# Patient Record
Sex: FEMALE | Race: WHITE | NOT HISPANIC OR LATINO | Employment: OTHER
[De-identification: names, ages, dates, MRNs, and addresses within clinical notes are randomized per-mention and may not be internally consistent; named-entity substitution may affect disease eponyms.]

---

## 2017-06-05 ENCOUNTER — RECORDS - HEALTHEAST (OUTPATIENT)
Dept: ADMINISTRATIVE | Facility: OTHER | Age: 82
End: 2017-06-05

## 2017-08-07 ENCOUNTER — COMMUNICATION - HEALTHEAST (OUTPATIENT)
Dept: FAMILY MEDICINE | Facility: CLINIC | Age: 82
End: 2017-08-07

## 2017-08-07 DIAGNOSIS — M85.80 LOW BONE MASS: ICD-10-CM

## 2017-08-23 ENCOUNTER — COMMUNICATION - HEALTHEAST (OUTPATIENT)
Dept: FAMILY MEDICINE | Facility: CLINIC | Age: 82
End: 2017-08-23

## 2017-08-23 DIAGNOSIS — I10 HTN (HYPERTENSION): ICD-10-CM

## 2017-08-23 DIAGNOSIS — I10 ESSENTIAL HYPERTENSION WITH GOAL BLOOD PRESSURE LESS THAN 140/90: ICD-10-CM

## 2017-09-12 ENCOUNTER — OFFICE VISIT - HEALTHEAST (OUTPATIENT)
Dept: FAMILY MEDICINE | Facility: CLINIC | Age: 82
End: 2017-09-12

## 2017-09-12 DIAGNOSIS — E78.5 HYPERLIPIDEMIA: ICD-10-CM

## 2017-09-12 DIAGNOSIS — R53.83 FATIGUE: ICD-10-CM

## 2017-09-12 DIAGNOSIS — M85.80 LOW BONE MASS: ICD-10-CM

## 2017-09-12 DIAGNOSIS — Z00.00 ROUTINE GENERAL MEDICAL EXAMINATION AT A HEALTH CARE FACILITY: ICD-10-CM

## 2017-09-12 DIAGNOSIS — I10 UNSPECIFIED ESSENTIAL HYPERTENSION: ICD-10-CM

## 2017-09-12 LAB
CHOLEST SERPL-MCNC: 193 MG/DL
FASTING STATUS PATIENT QL REPORTED: YES
HDLC SERPL-MCNC: 69 MG/DL
LDLC SERPL CALC-MCNC: 102 MG/DL
TRIGL SERPL-MCNC: 109 MG/DL

## 2017-09-12 ASSESSMENT — MIFFLIN-ST. JEOR: SCORE: 818.45

## 2017-12-15 ENCOUNTER — COMMUNICATION - HEALTHEAST (OUTPATIENT)
Dept: FAMILY MEDICINE | Facility: CLINIC | Age: 82
End: 2017-12-15

## 2017-12-15 DIAGNOSIS — I10 HTN (HYPERTENSION): ICD-10-CM

## 2018-04-16 ENCOUNTER — COMMUNICATION - HEALTHEAST (OUTPATIENT)
Dept: FAMILY MEDICINE | Facility: CLINIC | Age: 83
End: 2018-04-16

## 2018-04-16 DIAGNOSIS — M85.80 LOW BONE MASS: ICD-10-CM

## 2018-04-20 ENCOUNTER — COMMUNICATION - HEALTHEAST (OUTPATIENT)
Dept: FAMILY MEDICINE | Facility: CLINIC | Age: 83
End: 2018-04-20

## 2018-04-20 DIAGNOSIS — E78.5 HYPERLIPIDEMIA: ICD-10-CM

## 2018-08-19 ENCOUNTER — COMMUNICATION - HEALTHEAST (OUTPATIENT)
Dept: FAMILY MEDICINE | Facility: CLINIC | Age: 83
End: 2018-08-19

## 2018-08-19 DIAGNOSIS — M85.80 LOW BONE MASS: ICD-10-CM

## 2018-09-17 ENCOUNTER — OFFICE VISIT - HEALTHEAST (OUTPATIENT)
Dept: FAMILY MEDICINE | Facility: CLINIC | Age: 83
End: 2018-09-17

## 2018-09-17 DIAGNOSIS — E78.5 HYPERLIPIDEMIA: ICD-10-CM

## 2018-09-17 DIAGNOSIS — M85.80 LOW BONE MASS: ICD-10-CM

## 2018-09-17 DIAGNOSIS — Z00.00 MEDICARE ANNUAL WELLNESS VISIT, SUBSEQUENT: ICD-10-CM

## 2018-09-17 DIAGNOSIS — I10 ESSENTIAL HYPERTENSION: ICD-10-CM

## 2018-09-17 DIAGNOSIS — R41.3 MEMORY LOSS: ICD-10-CM

## 2018-09-17 DIAGNOSIS — R53.83 FATIGUE: ICD-10-CM

## 2018-09-17 LAB
ALBUMIN SERPL-MCNC: 4.2 G/DL (ref 3.5–5)
ALP SERPL-CCNC: 61 U/L (ref 45–120)
ALT SERPL W P-5'-P-CCNC: 13 U/L (ref 0–45)
ANION GAP SERPL CALCULATED.3IONS-SCNC: 11 MMOL/L (ref 5–18)
AST SERPL W P-5'-P-CCNC: 17 U/L (ref 0–40)
BILIRUB SERPL-MCNC: 0.6 MG/DL (ref 0–1)
BUN SERPL-MCNC: 10 MG/DL (ref 8–28)
CALCIUM SERPL-MCNC: 10.2 MG/DL (ref 8.5–10.5)
CHLORIDE BLD-SCNC: 100 MMOL/L (ref 98–107)
CHOLEST SERPL-MCNC: 186 MG/DL
CO2 SERPL-SCNC: 27 MMOL/L (ref 22–31)
CREAT SERPL-MCNC: 0.8 MG/DL (ref 0.6–1.1)
ERYTHROCYTE [DISTWIDTH] IN BLOOD BY AUTOMATED COUNT: 11 % (ref 11–14.5)
FASTING STATUS PATIENT QL REPORTED: YES
GFR SERPL CREATININE-BSD FRML MDRD: >60 ML/MIN/1.73M2
GLUCOSE BLD-MCNC: 92 MG/DL (ref 70–125)
HCT VFR BLD AUTO: 37.1 % (ref 35–47)
HDLC SERPL-MCNC: 58 MG/DL
HGB BLD-MCNC: 12.6 G/DL (ref 12–16)
LDLC SERPL CALC-MCNC: 102 MG/DL
MCH RBC QN AUTO: 31.7 PG (ref 27–34)
MCHC RBC AUTO-ENTMCNC: 33.9 G/DL (ref 32–36)
MCV RBC AUTO: 94 FL (ref 80–100)
PLATELET # BLD AUTO: 227 THOU/UL (ref 140–440)
PMV BLD AUTO: 8.2 FL (ref 7–10)
POTASSIUM BLD-SCNC: 4.5 MMOL/L (ref 3.5–5)
PROT SERPL-MCNC: 7.8 G/DL (ref 6–8)
RBC # BLD AUTO: 3.96 MILL/UL (ref 3.8–5.4)
SODIUM SERPL-SCNC: 138 MMOL/L (ref 136–145)
TRIGL SERPL-MCNC: 132 MG/DL
TSH SERPL DL<=0.005 MIU/L-ACNC: 2.07 UIU/ML (ref 0.3–5)
WBC: 6.9 THOU/UL (ref 4–11)

## 2018-09-17 ASSESSMENT — MIFFLIN-ST. JEOR: SCORE: 805.98

## 2018-09-18 LAB — 25(OH)D3 SERPL-MCNC: 31.4 NG/ML (ref 30–80)

## 2018-10-16 ENCOUNTER — RECORDS - HEALTHEAST (OUTPATIENT)
Dept: ADMINISTRATIVE | Facility: OTHER | Age: 83
End: 2018-10-16

## 2018-12-13 ENCOUNTER — COMMUNICATION - HEALTHEAST (OUTPATIENT)
Dept: FAMILY MEDICINE | Facility: CLINIC | Age: 83
End: 2018-12-13

## 2018-12-13 DIAGNOSIS — I10 HTN (HYPERTENSION): ICD-10-CM

## 2018-12-13 DIAGNOSIS — E78.5 HYPERLIPIDEMIA: ICD-10-CM

## 2019-04-13 ENCOUNTER — COMMUNICATION - HEALTHEAST (OUTPATIENT)
Dept: FAMILY MEDICINE | Facility: CLINIC | Age: 84
End: 2019-04-13

## 2019-04-13 DIAGNOSIS — M85.80 LOW BONE MASS: ICD-10-CM

## 2019-04-16 ENCOUNTER — COMMUNICATION - HEALTHEAST (OUTPATIENT)
Dept: FAMILY MEDICINE | Facility: CLINIC | Age: 84
End: 2019-04-16

## 2019-04-16 DIAGNOSIS — M85.80 LOW BONE MASS: ICD-10-CM

## 2019-07-08 ENCOUNTER — COMMUNICATION - HEALTHEAST (OUTPATIENT)
Dept: FAMILY MEDICINE | Facility: CLINIC | Age: 84
End: 2019-07-08

## 2019-07-08 DIAGNOSIS — M85.80 LOW BONE MASS: ICD-10-CM

## 2019-10-03 ENCOUNTER — OFFICE VISIT - HEALTHEAST (OUTPATIENT)
Dept: FAMILY MEDICINE | Facility: CLINIC | Age: 84
End: 2019-10-03

## 2019-10-03 DIAGNOSIS — M85.80 LOW BONE MASS: ICD-10-CM

## 2019-10-03 DIAGNOSIS — R41.3 MEMORY LOSS: ICD-10-CM

## 2019-10-03 DIAGNOSIS — Z00.00 MEDICARE ANNUAL WELLNESS VISIT, SUBSEQUENT: ICD-10-CM

## 2019-10-03 DIAGNOSIS — E56.9 VITAMIN DEFICIENCY: ICD-10-CM

## 2019-10-03 DIAGNOSIS — I10 ESSENTIAL HYPERTENSION: ICD-10-CM

## 2019-10-03 DIAGNOSIS — E78.5 HYPERLIPIDEMIA, UNSPECIFIED HYPERLIPIDEMIA TYPE: ICD-10-CM

## 2019-10-03 LAB
ALBUMIN SERPL-MCNC: 4.1 G/DL (ref 3.5–5)
ALP SERPL-CCNC: 66 U/L (ref 45–120)
ALT SERPL W P-5'-P-CCNC: 14 U/L (ref 0–45)
ANION GAP SERPL CALCULATED.3IONS-SCNC: 12 MMOL/L (ref 5–18)
AST SERPL W P-5'-P-CCNC: 19 U/L (ref 0–40)
BILIRUB SERPL-MCNC: 0.7 MG/DL (ref 0–1)
BUN SERPL-MCNC: 10 MG/DL (ref 8–28)
CALCIUM SERPL-MCNC: 10.3 MG/DL (ref 8.5–10.5)
CHLORIDE BLD-SCNC: 96 MMOL/L (ref 98–107)
CHOLEST SERPL-MCNC: 173 MG/DL
CO2 SERPL-SCNC: 28 MMOL/L (ref 22–31)
CREAT SERPL-MCNC: 0.8 MG/DL (ref 0.6–1.1)
ERYTHROCYTE [DISTWIDTH] IN BLOOD BY AUTOMATED COUNT: 11.2 % (ref 11–14.5)
FASTING STATUS PATIENT QL REPORTED: YES
GFR SERPL CREATININE-BSD FRML MDRD: >60 ML/MIN/1.73M2
GLUCOSE BLD-MCNC: 96 MG/DL (ref 70–125)
HCT VFR BLD AUTO: 35.9 % (ref 35–47)
HDLC SERPL-MCNC: 66 MG/DL
HGB BLD-MCNC: 12.3 G/DL (ref 12–16)
LDLC SERPL CALC-MCNC: 76 MG/DL
MCH RBC QN AUTO: 32.5 PG (ref 27–34)
MCHC RBC AUTO-ENTMCNC: 34.2 G/DL (ref 32–36)
MCV RBC AUTO: 95 FL (ref 80–100)
PLATELET # BLD AUTO: 230 THOU/UL (ref 140–440)
PMV BLD AUTO: 7.6 FL (ref 7–10)
POTASSIUM BLD-SCNC: 4.4 MMOL/L (ref 3.5–5)
PROT SERPL-MCNC: 7.4 G/DL (ref 6–8)
RBC # BLD AUTO: 3.77 MILL/UL (ref 3.8–5.4)
SODIUM SERPL-SCNC: 136 MMOL/L (ref 136–145)
TRIGL SERPL-MCNC: 155 MG/DL
TSH SERPL DL<=0.005 MIU/L-ACNC: 1.88 UIU/ML (ref 0.3–5)
WBC: 6.8 THOU/UL (ref 4–11)

## 2019-10-03 ASSESSMENT — MIFFLIN-ST. JEOR: SCORE: 828.82

## 2019-10-04 LAB — 25(OH)D3 SERPL-MCNC: 30.1 NG/ML (ref 30–80)

## 2019-10-07 ENCOUNTER — COMMUNICATION - HEALTHEAST (OUTPATIENT)
Dept: FAMILY MEDICINE | Facility: CLINIC | Age: 84
End: 2019-10-07

## 2019-10-22 ENCOUNTER — COMMUNICATION - HEALTHEAST (OUTPATIENT)
Dept: FAMILY MEDICINE | Facility: CLINIC | Age: 84
End: 2019-10-22

## 2019-10-22 DIAGNOSIS — M85.80 LOW BONE MASS: ICD-10-CM

## 2019-10-23 ENCOUNTER — RECORDS - HEALTHEAST (OUTPATIENT)
Dept: ADMINISTRATIVE | Facility: OTHER | Age: 84
End: 2019-10-23

## 2019-11-01 ENCOUNTER — COMMUNICATION - HEALTHEAST (OUTPATIENT)
Dept: FAMILY MEDICINE | Facility: CLINIC | Age: 84
End: 2019-11-01

## 2019-11-01 DIAGNOSIS — M85.80 LOW BONE MASS: ICD-10-CM

## 2019-11-06 ENCOUNTER — COMMUNICATION - HEALTHEAST (OUTPATIENT)
Dept: FAMILY MEDICINE | Facility: CLINIC | Age: 84
End: 2019-11-06

## 2019-11-06 DIAGNOSIS — I10 HTN (HYPERTENSION): ICD-10-CM

## 2019-11-06 DIAGNOSIS — M85.80 LOW BONE MASS: ICD-10-CM

## 2019-11-06 DIAGNOSIS — E78.5 HYPERLIPIDEMIA: ICD-10-CM

## 2020-05-05 ENCOUNTER — COMMUNICATION - HEALTHEAST (OUTPATIENT)
Dept: FAMILY MEDICINE | Facility: CLINIC | Age: 85
End: 2020-05-05

## 2020-05-05 DIAGNOSIS — R41.3 MEMORY LOSS: ICD-10-CM

## 2020-08-04 ENCOUNTER — COMMUNICATION - HEALTHEAST (OUTPATIENT)
Dept: FAMILY MEDICINE | Facility: CLINIC | Age: 85
End: 2020-08-04

## 2020-08-04 DIAGNOSIS — R41.3 MEMORY LOSS: ICD-10-CM

## 2020-10-20 ENCOUNTER — COMMUNICATION - HEALTHEAST (OUTPATIENT)
Dept: FAMILY MEDICINE | Facility: CLINIC | Age: 85
End: 2020-10-20

## 2020-10-20 DIAGNOSIS — R41.3 MEMORY LOSS: ICD-10-CM

## 2020-10-21 ENCOUNTER — COMMUNICATION - HEALTHEAST (OUTPATIENT)
Dept: FAMILY MEDICINE | Facility: CLINIC | Age: 85
End: 2020-10-21

## 2020-10-21 DIAGNOSIS — M85.80 LOW BONE MASS: ICD-10-CM

## 2020-10-21 DIAGNOSIS — I10 HTN (HYPERTENSION): ICD-10-CM

## 2020-10-21 DIAGNOSIS — M25.559 HIP PAIN: ICD-10-CM

## 2020-10-21 DIAGNOSIS — E78.5 HYPERLIPIDEMIA: ICD-10-CM

## 2020-10-26 ENCOUNTER — COMMUNICATION - HEALTHEAST (OUTPATIENT)
Dept: TELEHEALTH | Facility: CLINIC | Age: 85
End: 2020-10-26

## 2020-10-26 ENCOUNTER — OFFICE VISIT - HEALTHEAST (OUTPATIENT)
Dept: FAMILY MEDICINE | Facility: CLINIC | Age: 85
End: 2020-10-26

## 2020-10-26 ENCOUNTER — COMMUNICATION - HEALTHEAST (OUTPATIENT)
Dept: FAMILY MEDICINE | Facility: CLINIC | Age: 85
End: 2020-10-26

## 2020-10-26 DIAGNOSIS — E78.5 HYPERLIPIDEMIA, UNSPECIFIED HYPERLIPIDEMIA TYPE: ICD-10-CM

## 2020-10-26 DIAGNOSIS — I10 ESSENTIAL HYPERTENSION: ICD-10-CM

## 2020-10-26 DIAGNOSIS — K13.0 LIP ULCER: ICD-10-CM

## 2020-10-26 DIAGNOSIS — R41.3 MEMORY LOSS: ICD-10-CM

## 2020-10-26 DIAGNOSIS — M85.80 LOW BONE MASS: ICD-10-CM

## 2020-10-26 DIAGNOSIS — E55.9 VITAMIN D DEFICIENCY: ICD-10-CM

## 2020-10-26 RX ORDER — ALENDRONATE SODIUM 70 MG/1
TABLET ORAL
Qty: 12 TABLET | Refills: 3 | Status: SHIPPED | OUTPATIENT
Start: 2020-10-26 | End: 2021-11-08 | Stop reason: ALTCHOICE

## 2020-10-28 ENCOUNTER — COMMUNICATION - HEALTHEAST (OUTPATIENT)
Dept: FAMILY MEDICINE | Facility: CLINIC | Age: 85
End: 2020-10-28

## 2020-10-28 DIAGNOSIS — R41.3 MEMORY LOSS: ICD-10-CM

## 2020-11-11 ENCOUNTER — COMMUNICATION - HEALTHEAST (OUTPATIENT)
Dept: FAMILY MEDICINE | Facility: CLINIC | Age: 85
End: 2020-11-11

## 2020-11-11 DIAGNOSIS — R41.3 MEMORY LOSS: ICD-10-CM

## 2020-11-15 RX ORDER — MEMANTINE HYDROCHLORIDE 5 MG/1
5 TABLET ORAL DAILY
Qty: 90 TABLET | Refills: 3 | Status: SHIPPED | OUTPATIENT
Start: 2020-11-15 | End: 2022-08-04

## 2021-01-13 ENCOUNTER — AMBULATORY - HEALTHEAST (OUTPATIENT)
Dept: FAMILY MEDICINE | Facility: CLINIC | Age: 86
End: 2021-01-13

## 2021-01-13 DIAGNOSIS — E55.9 VITAMIN D DEFICIENCY: ICD-10-CM

## 2021-02-02 ENCOUNTER — COMMUNICATION - HEALTHEAST (OUTPATIENT)
Dept: FAMILY MEDICINE | Facility: CLINIC | Age: 86
End: 2021-02-02

## 2021-02-02 DIAGNOSIS — I10 HTN (HYPERTENSION): ICD-10-CM

## 2021-02-02 DIAGNOSIS — E78.5 HYPERLIPIDEMIA: ICD-10-CM

## 2021-02-02 DIAGNOSIS — K13.0 LIP ULCER: ICD-10-CM

## 2021-02-02 DIAGNOSIS — M25.559 HIP PAIN: ICD-10-CM

## 2021-02-02 RX ORDER — MUPIROCIN 20 MG/G
OINTMENT TOPICAL
Qty: 22 G | Refills: 0 | Status: SHIPPED | OUTPATIENT
Start: 2021-02-02 | End: 2021-07-15

## 2021-02-10 ENCOUNTER — RECORDS - HEALTHEAST (OUTPATIENT)
Dept: ADMINISTRATIVE | Facility: OTHER | Age: 86
End: 2021-02-10

## 2021-02-12 ENCOUNTER — RECORDS - HEALTHEAST (OUTPATIENT)
Dept: ADMINISTRATIVE | Facility: OTHER | Age: 86
End: 2021-02-12

## 2021-02-15 ENCOUNTER — COMMUNICATION - HEALTHEAST (OUTPATIENT)
Dept: FAMILY MEDICINE | Facility: CLINIC | Age: 86
End: 2021-02-15

## 2021-02-16 ENCOUNTER — OFFICE VISIT - HEALTHEAST (OUTPATIENT)
Dept: FAMILY MEDICINE | Facility: CLINIC | Age: 86
End: 2021-02-16

## 2021-02-16 DIAGNOSIS — D72.9 ABNORMAL WHITE BLOOD CELL: ICD-10-CM

## 2021-02-16 DIAGNOSIS — Z09 HOSPITAL DISCHARGE FOLLOW-UP: ICD-10-CM

## 2021-02-16 DIAGNOSIS — C50.912 BILATERAL MALIGNANT NEOPLASM OF BREAST IN FEMALE, UNSPECIFIED ESTROGEN RECEPTOR STATUS, UNSPECIFIED SITE OF BREAST (H): ICD-10-CM

## 2021-02-16 DIAGNOSIS — R07.89 CHEST WALL PAIN: ICD-10-CM

## 2021-02-16 DIAGNOSIS — C50.911 BILATERAL MALIGNANT NEOPLASM OF BREAST IN FEMALE, UNSPECIFIED ESTROGEN RECEPTOR STATUS, UNSPECIFIED SITE OF BREAST (H): ICD-10-CM

## 2021-02-16 DIAGNOSIS — D64.9 ANEMIA, UNSPECIFIED TYPE: ICD-10-CM

## 2021-02-16 ASSESSMENT — MIFFLIN-ST. JEOR: SCORE: 793.19

## 2021-02-17 LAB
BASOPHILS # BLD AUTO: 0 THOU/UL (ref 0–0.2)
BASOPHILS NFR BLD AUTO: 0 % (ref 0–2)
EOSINOPHIL COUNT (ABSOLUTE): 0.1 THOU/UL (ref 0–0.4)
EOSINOPHIL NFR BLD AUTO: 1 % (ref 0–6)
ERYTHROCYTE [DISTWIDTH] IN BLOOD BY AUTOMATED COUNT: 13.2 % (ref 11–14.5)
HCT VFR BLD AUTO: 34.8 % (ref 35–47)
HGB BLD-MCNC: 11.2 G/DL (ref 12–16)
IMMATURE GRANULOCYTE % - MAN (DIFF): 0 %
IMMATURE GRANULOCYTE ABSOLUTE - MAN (DIFF): 0 THOU/UL
LAB AP CHARGES (HE HISTORICAL CONVERSION): NORMAL
LYMPHOCYTES # BLD AUTO: 1.8 THOU/UL (ref 0.8–4.4)
LYMPHOCYTES NFR BLD AUTO: 13 % (ref 20–40)
MCH RBC QN AUTO: 31.4 PG (ref 27–34)
MCHC RBC AUTO-ENTMCNC: 32.2 G/DL (ref 32–36)
MCV RBC AUTO: 98 FL (ref 80–100)
MONOCYTES # BLD AUTO: 2.5 THOU/UL (ref 0–0.9)
MONOCYTES NFR BLD AUTO: 18 % (ref 2–10)
PATH REPORT.COMMENTS IMP SPEC: NORMAL
PATH REPORT.COMMENTS IMP SPEC: NORMAL
PATH REPORT.FINAL DX SPEC: NORMAL
PATH REPORT.MICROSCOPIC SPEC OTHER STN: ABNORMAL
PATH REPORT.MICROSCOPIC SPEC OTHER STN: NORMAL
PATH REPORT.RELEVANT HX SPEC: NORMAL
PLAT MORPH BLD: NORMAL
PLATELET # BLD AUTO: 240 THOU/UL (ref 140–440)
PMV BLD AUTO: 11.8 FL (ref 8.5–12.5)
RBC # BLD AUTO: 3.57 MILL/UL (ref 3.8–5.4)
TOTAL NEUTROPHILS-ABS(DIFF): 9.8 THOU/UL (ref 2–7.7)
TOTAL NEUTROPHILS-REL(DIFF): 70 % (ref 50–70)
WBC: 14.1 THOU/UL (ref 4–11)

## 2021-02-19 ENCOUNTER — COMMUNICATION - HEALTHEAST (OUTPATIENT)
Dept: FAMILY MEDICINE | Facility: CLINIC | Age: 86
End: 2021-02-19

## 2021-02-22 ENCOUNTER — COMMUNICATION - HEALTHEAST (OUTPATIENT)
Dept: FAMILY MEDICINE | Facility: CLINIC | Age: 86
End: 2021-02-22

## 2021-02-23 ENCOUNTER — COMMUNICATION - HEALTHEAST (OUTPATIENT)
Dept: FAMILY MEDICINE | Facility: CLINIC | Age: 86
End: 2021-02-23

## 2021-02-23 ENCOUNTER — COMMUNICATION - HEALTHEAST (OUTPATIENT)
Dept: SCHEDULING | Facility: CLINIC | Age: 86
End: 2021-02-23

## 2021-02-25 ENCOUNTER — RECORDS - HEALTHEAST (OUTPATIENT)
Dept: ADMINISTRATIVE | Facility: OTHER | Age: 86
End: 2021-02-25

## 2021-02-25 ENCOUNTER — COMMUNICATION - HEALTHEAST (OUTPATIENT)
Dept: FAMILY MEDICINE | Facility: CLINIC | Age: 86
End: 2021-02-25

## 2021-02-26 ENCOUNTER — COMMUNICATION - HEALTHEAST (OUTPATIENT)
Dept: FAMILY MEDICINE | Facility: CLINIC | Age: 86
End: 2021-02-26

## 2021-02-26 ENCOUNTER — RECORDS - HEALTHEAST (OUTPATIENT)
Dept: ADMINISTRATIVE | Facility: OTHER | Age: 86
End: 2021-02-26

## 2021-03-02 ENCOUNTER — COMMUNICATION - HEALTHEAST (OUTPATIENT)
Dept: FAMILY MEDICINE | Facility: CLINIC | Age: 86
End: 2021-03-02

## 2021-03-02 ENCOUNTER — COMMUNICATION - HEALTHEAST (OUTPATIENT)
Dept: SCHEDULING | Facility: CLINIC | Age: 86
End: 2021-03-02

## 2021-03-04 ENCOUNTER — RECORDS - HEALTHEAST (OUTPATIENT)
Dept: ADMINISTRATIVE | Facility: OTHER | Age: 86
End: 2021-03-04

## 2021-03-08 ENCOUNTER — RECORDS - HEALTHEAST (OUTPATIENT)
Dept: ADMINISTRATIVE | Facility: OTHER | Age: 86
End: 2021-03-08

## 2021-03-09 ENCOUNTER — AMBULATORY - HEALTHEAST (OUTPATIENT)
Dept: OTHER | Facility: CLINIC | Age: 86
End: 2021-03-09

## 2021-03-11 ENCOUNTER — COMMUNICATION - HEALTHEAST (OUTPATIENT)
Dept: FAMILY MEDICINE | Facility: CLINIC | Age: 86
End: 2021-03-11

## 2021-03-25 ENCOUNTER — RECORDS - HEALTHEAST (OUTPATIENT)
Dept: ADMINISTRATIVE | Facility: OTHER | Age: 86
End: 2021-03-25

## 2021-03-29 ENCOUNTER — COMMUNICATION - HEALTHEAST (OUTPATIENT)
Dept: FAMILY MEDICINE | Facility: CLINIC | Age: 86
End: 2021-03-29

## 2021-04-27 ENCOUNTER — RECORDS - HEALTHEAST (OUTPATIENT)
Dept: FAMILY MEDICINE | Facility: CLINIC | Age: 86
End: 2021-04-27

## 2021-04-27 ENCOUNTER — COMMUNICATION - HEALTHEAST (OUTPATIENT)
Dept: FAMILY MEDICINE | Facility: CLINIC | Age: 86
End: 2021-04-27

## 2021-04-27 DIAGNOSIS — I10 HTN (HYPERTENSION): ICD-10-CM

## 2021-04-27 DIAGNOSIS — E78.5 HYPERLIPIDEMIA: ICD-10-CM

## 2021-04-27 RX ORDER — ATORVASTATIN CALCIUM 20 MG/1
20 TABLET, FILM COATED ORAL DAILY
Qty: 90 TABLET | Refills: 3 | Status: ON HOLD | OUTPATIENT
Start: 2021-04-27 | End: 2023-01-01

## 2021-04-28 RX ORDER — HYDROCHLOROTHIAZIDE 25 MG/1
TABLET ORAL
Qty: 90 TABLET | Refills: 0 | Status: SHIPPED | OUTPATIENT
Start: 2021-04-28 | End: 2021-07-22

## 2021-05-01 ENCOUNTER — HEALTH MAINTENANCE LETTER (OUTPATIENT)
Age: 86
End: 2021-05-01

## 2021-05-27 VITALS — HEART RATE: 103 BPM | DIASTOLIC BLOOD PRESSURE: 60 MMHG | SYSTOLIC BLOOD PRESSURE: 105 MMHG

## 2021-05-27 NOTE — TELEPHONE ENCOUNTER
Refill Approved    Rx renewed per Medication Renewal Policy. Medication was last renewed on 8/20/18.    Tess Herron, [facility] Triage/Med Refill 4/15/2019     Requested Prescriptions   Pending Prescriptions Disp Refills     alendronate (FOSAMAX) 70 MG tablet [Pharmacy Med Name: ALENDRONATE 70MG    TAB] 12 tablet 0     Sig: TAKE 1 TABLET BY MOUTH ONCE A WEEK IN THE MORNING ON AN EMPTY STOMACH WITH  A  FULL  GLASS  OF  WATER,  30  MINUTES  BEFORE  FOOD.       Biphosphonates Refill Protocol Passed - 4/13/2019  3:06 PM        Passed - PCP or prescribing provider visit in last 12 months     Last office visit with prescriber/PCP: 6/23/2016 Rajiv Moe MD OR same dept: Visit date not found OR same specialty: 6/23/2016 Rajiv Moe MD  Last physical: 9/17/2018 Last MTM visit: Visit date not found   Next visit within 3 mo: Visit date not found  Next physical within 3 mo: Visit date not found  Prescriber OR PCP: Rajiv Moe MD  Last diagnosis associated with med order: 1. Low bone mass  - alendronate (FOSAMAX) 70 MG tablet [Pharmacy Med Name: ALENDRONATE 70MG    TAB]; TAKE 1 TABLET BY MOUTH ONCE A WEEK IN THE MORNING ON AN EMPTY STOMACH WITH A FULL GLASS OF WATER, 30 MINUTES BEFORE FOOD  Dispense: 12 tablet; Refill: 0    If protocol passes may refill for 12 months if within 3 months of last provider visit (or a total of 15 months).             Passed - Serum creatinine in last 12 months     Creatinine   Date Value Ref Range Status   09/17/2018 0.80 0.60 - 1.10 mg/dL Final

## 2021-05-30 NOTE — TELEPHONE ENCOUNTER
Refill Approved    Rx renewed per Medication Renewal Policy. Medication was last renewed on 4/15/19.    Tess Herron, [facility] Triage/Med Refill 7/8/2019     Requested Prescriptions   Pending Prescriptions Disp Refills     alendronate (FOSAMAX) 70 MG tablet [Pharmacy Med Name: ALENDRONATE 70MG    TAB] 12 tablet 0     Sig: TAKE 1 TABLET BY MOUTH ONCE A WEEK IN THE MORNING ON AN EMPTY STOMACH WITH  A  FULL  GLASS  OF  WATER,  30  MINUTES  BEFORE  FOOD       Biphosphonates Refill Protocol Passed - 7/8/2019  9:59 AM        Passed - PCP or prescribing provider visit in last 12 months     Last office visit with prescriber/PCP: 6/23/2016 Rajiv Moe MD OR same dept: Visit date not found OR same specialty: 6/23/2016 Rajiv Moe MD  Last physical: 9/17/2018 Last MTM visit: Visit date not found   Next visit within 3 mo: Visit date not found  Next physical within 3 mo: Visit date not found  Prescriber OR PCP: Rajiv Moe MD  Last diagnosis associated with med order: 1. Low bone mass  - alendronate (FOSAMAX) 70 MG tablet [Pharmacy Med Name: ALENDRONATE 70MG    TAB]; TAKE 1 TABLET BY MOUTH ONCE A WEEK IN THE MORNING ON AN EMPTY STOMACH WITH  A  FULL  GLASS  OF  WATER,  30  MINUTES  BEFORE  FOOD.  Dispense: 12 tablet; Refill: 0    If protocol passes may refill for 12 months if within 3 months of last provider visit (or a total of 15 months).             Passed - Serum creatinine in last 12 months     Creatinine   Date Value Ref Range Status   09/17/2018 0.80 0.60 - 1.10 mg/dL Final

## 2021-05-31 VITALS — BODY MASS INDEX: 24.14 KG/M2 | WEIGHT: 115 LBS | HEIGHT: 58 IN

## 2021-06-02 VITALS — WEIGHT: 114 LBS | HEIGHT: 57 IN | BODY MASS INDEX: 24.59 KG/M2

## 2021-06-02 NOTE — TELEPHONE ENCOUNTER
Faxed copy of labs to Sister per Dr. Moe request    ----- Message from Rajiv Moe MD sent at 10/7/2019  1:01 PM CDT -----  I spoke with the patient's daughter, Cuca, at [phone].    Will increase vitamin D supplement to take an additional 1000 units daily.    Monitor blood pressure call if not less than 140/90.    Also could you please send a copy of the labs to Cuca Eleno    Address is: [address] [place]  [address]

## 2021-06-02 NOTE — PROGRESS NOTES
Subjective: Patient comes in for evaluation this 89-year-old female is here for a annual wellness visit.  She is here with her daughter Cuca Yang, Cuca's address is [address] [place] [id number], her phone number [phone].    Patient has had her shingles shots in October 2018 in April 2019.  She had a bone density in 2015.    Mini cog care was 4 out of 5 she does have some memory loss issues it slowly gotten worse but no significant drastic changes.    We discussed options and elected to try Namenda low-dose 5 mg 1 a day.    She is had previous mastectomy bilaterally for her breast cancer    Meds include hydrochlorothiazide atorvastatin aspirin alendronate calcium and vitamin D.    She sees [facility].  Also sees dermatology.  Patient does have a lesion on the right lower lip but small scab that continues to recur does not look herpetic little worried regarding cancer we will have her follow-up with dermatologist.  Her daughter will make the appointment.    Labs today included CMP vitamin D lipid TSH and CBC.    I reviewed the annual wellness visit health risk assessment.    She does eat well no concerns she feels she does not need help but does get some help from the family regarding laundry and housekeeping.    No concerns regarding urine or hearing.    No depression symptoms    She does have a living will.    Her height and weight are appropriate.    Tobacco status: She  reports that she has never smoked. She has never used smokeless tobacco.    Patient Active Problem List    Diagnosis Date Noted     Low bone mass 11/09/2016     Cerumen Impaction      Hypertension        Current Outpatient Medications   Medication Sig Dispense Refill     alendronate (FOSAMAX) 70 MG tablet TAKE 1 TABLET BY MOUTH ONCE A WEEK IN THE MORNING ON AN EMPTY STOMACH WITH  A  FULL  GLASS  OF  WATER,  30  MINUTES  BEFORE  FOOD 12 tablet 0     aspirin 81 MG EC tablet Take 81 mg by mouth daily.        "atorvastatin (LIPITOR) 20 MG tablet Take 1 tablet (20 mg total) by mouth daily. 90 tablet 2     hydroCHLOROthiazide (HYDRODIURIL) 25 MG tablet TAKE ONE TABLET BY MOUTH ONCE DAILY. 90 tablet 2     memantine (NAMENDA) 5 MG tablet Take 1 tablet (5 mg total) by mouth daily. 30 tablet 6     multivitamin (MULTIVITAMIN) per tablet Take 1 tablet by mouth daily.  0     naproxen (NAPROSYN) 375 MG tablet TAKE ONE TABLET BY MOUTH TWICE DAILY WITH FOOD AS NEEDED FOR PAIN 30 tablet 2     No current facility-administered medications for this visit.        ROS:   10 point review of systems positive as discussed above otherwise negative    Objective:    /82 (Patient Site: Left Arm, Patient Position: Sitting, Cuff Size: Adult Regular)   Pulse 92   Temp 97.7  F (36.5  C) (Oral)   Resp 16   Ht 4' 10.73\" (1.492 m)   Wt 113 lb (51.3 kg)   BMI 23.04 kg/m    Body mass index is 23.04 kg/m .      General appearance no acute distress    Neck is supple no adenopathy oropharynx clear pupils react normally extract movements full    Oropharynx was clear    She does have a ulceration of the right lower lip, will have her see dermatology as outlined    Neck without adenopathy    Lungs are clear no rales or rhonchi, heart was regular rate at 90 no murmur    Abdomen soft nontender no guarding or rebound no.  By history having no problems with the incisions from the mastectomy    Lower extremities without edema skin is normal    Denies any pelvic or rectal symptoms no blood in stool no vaginal bleeding.    Lab work vitamin D 30.1 BMP was normal    TSH was normal.    CBC normal    LDL 76 HDL 66    Results for orders placed or performed in visit on 10/03/19   Vitamin D, Total (25-Hydroxy)   Result Value Ref Range    Vitamin D, Total (25-Hydroxy) 30.1 30.0 - 80.0 ng/mL   Comprehensive Metabolic Panel   Result Value Ref Range    Sodium 136 136 - 145 mmol/L    Potassium 4.4 3.5 - 5.0 mmol/L    Chloride 96 (L) 98 - 107 mmol/L    CO2 28 22 - 31 " mmol/L    Anion Gap, Calculation 12 5 - 18 mmol/L    Glucose 96 70 - 125 mg/dL    BUN 10 8 - 28 mg/dL    Creatinine 0.80 0.60 - 1.10 mg/dL    GFR MDRD Af Amer >60 >60 mL/min/1.73m2    GFR MDRD Non Af Amer >60 >60 mL/min/1.73m2    Bilirubin, Total 0.7 0.0 - 1.0 mg/dL    Calcium 10.3 8.5 - 10.5 mg/dL    Protein, Total 7.4 6.0 - 8.0 g/dL    Albumin 4.1 3.5 - 5.0 g/dL    Alkaline Phosphatase 66 45 - 120 U/L    AST 19 0 - 40 U/L    ALT 14 0 - 45 U/L   Lipid Cascade FASTING   Result Value Ref Range    Cholesterol 173 <=199 mg/dL    Triglycerides 155 (H) <=149 mg/dL    HDL Cholesterol 66 >=50 mg/dL    LDL Calculated 76 <=129 mg/dL    Patient Fasting > 8hrs? Yes    Thyroid Stimulating Hormone (TSH)   Result Value Ref Range    TSH 1.88 0.30 - 5.00 uIU/mL   HM2(CBC w/o Differential)   Result Value Ref Range    WBC 6.8 4.0 - 11.0 thou/uL    RBC 3.77 (L) 3.80 - 5.40 mill/uL    Hemoglobin 12.3 12.0 - 16.0 g/dL    Hematocrit 35.9 35.0 - 47.0 %    MCV 95 80 - 100 fL    MCH 32.5 27.0 - 34.0 pg    MCHC 34.2 32.0 - 36.0 g/dL    RDW 11.2 11.0 - 14.5 %    Platelets 230 140 - 440 thou/uL    MPV 7.6 7.0 - 10.0 fL       Assessment:  1. Medicare annual wellness visit, subsequent     2. Hypertension  Comprehensive Metabolic Panel    Thyroid Stimulating Hormone (TSH)   3. Low bone mass  Vitamin D, Total (25-Hydroxy)   4. Hyperlipidemia, unspecified hyperlipidemia type  Comprehensive Metabolic Panel    Lipid Cascade FASTING   5. Memory loss  memantine (NAMENDA) 5 MG tablet    HM2(CBC w/o Differential)   6. Vitamin deficiency       Stable annual wellness visit.    Some memory loss worsening somewhat will start on Namenda low-dose 5 mg daily.    Low bone mass continue on same calcium vitamin D and Fosamax.    Hyperlipidemia controlled with the atorvastatin    Hypertension controlled.    Continue to see her ophthalmologist    Lower lip lesion, see dermatology    Plan: As outlined above we will contact her daughter regarding results.    This  transcription uses voice recognition software, which may contain typographical errors.

## 2021-06-02 NOTE — TELEPHONE ENCOUNTER
Refill Approved    Rx renewed per Medication Renewal Policy. Medication was last renewed on 7/8/2019.  Last OV 10/3/2019.    Tyesha Rainey, [facility] Triage/Med Refill 10/22/2019     Requested Prescriptions   Pending Prescriptions Disp Refills     alendronate (FOSAMAX) 70 MG tablet 12 tablet 0     Sig: Take in the morning on an empty stomach with a full glass of water 30 minutes before food       Biphosphonates Refill Protocol Passed - 10/22/2019  7:38 AM        Passed - PCP or prescribing provider visit in last 12 months     Last office visit with prescriber/PCP: 6/23/2016 Rajiv Moe MD OR same dept: Visit date not found OR same specialty: 6/23/2016 Rajiv Moe MD  Last physical: 10/3/2019 Last MTM visit: Visit date not found   Next visit within 3 mo: Visit date not found  Next physical within 3 mo: Visit date not found  Prescriber OR PCP: Rajiv Moe MD  Last diagnosis associated with med order: 1. Low bone mass  - alendronate (FOSAMAX) 70 MG tablet; Take in the morning on an empty stomach with a full glass of water 30 minutes before food  Dispense: 12 tablet; Refill: 0    If protocol passes may refill for 12 months if within 3 months of last provider visit (or a total of 15 months).             Passed - Serum creatinine in last 12 months     Creatinine   Date Value Ref Range Status   10/03/2019 0.80 0.60 - 1.10 mg/dL Final

## 2021-06-02 NOTE — PATIENT INSTRUCTIONS - HE
No change in medicine except adding Namenda 5 mg 1 a day    Continue to see her ophthalmologist    Please see the rheumatologist regarding your sore on your lip.    We will contact her daughter, Cuca regarding the results of the blood work.

## 2021-06-03 VITALS
HEART RATE: 92 BPM | RESPIRATION RATE: 16 BRPM | WEIGHT: 113 LBS | BODY MASS INDEX: 22.78 KG/M2 | DIASTOLIC BLOOD PRESSURE: 82 MMHG | HEIGHT: 59 IN | TEMPERATURE: 97.7 F | SYSTOLIC BLOOD PRESSURE: 138 MMHG

## 2021-06-03 NOTE — TELEPHONE ENCOUNTER
Refill Approved    Rx renewed per Medication Renewal Policy. Medication was last renewed on 12/15/18.    Francisca Venegas, [facility] Triage/Med Refill 11/6/2019     Requested Prescriptions   Pending Prescriptions Disp Refills     hydroCHLOROthiazide (HYDRODIURIL) 25 MG tablet [Pharmacy Med Name: HYDROCHLOROT 25MG   TAB] 90 tablet 2     Sig: TAKE 1 TABLET BY MOUTH ONCE DAILY       Diuretics/Combination Diuretics Refill Protocol  Passed - 11/6/2019  8:08 AM        Passed - Visit with PCP or prescribing provider visit in past 12 months     Last office visit with prescriber/PCP: 6/23/2016 Rajiv Moe MD OR same dept: Visit date not found OR same specialty: 6/23/2016 Rajiv Moe MD  Last physical: 10/3/2019 Last MTM visit: Visit date not found   Next visit within 3 mo: Visit date not found  Next physical within 3 mo: Visit date not found  Prescriber OR PCP: Rajiv Moe MD  Last diagnosis associated with med order: 1. HTN (hypertension)  - hydroCHLOROthiazide (HYDRODIURIL) 25 MG tablet [Pharmacy Med Name: HYDROCHLOROT 25MG   TAB]; TAKE 1 TABLET BY MOUTH ONCE DAILY  Dispense: 90 tablet; Refill: 2    2. Hyperlipidemia  - atorvastatin (LIPITOR) 20 MG tablet [Pharmacy Med Name: ATORVASTATIN 20MG   TAB]; TAKE 1 TABLET BY MOUTH ONCE DAILY  Dispense: 90 tablet; Refill: 2    If protocol passes may refill for 12 months if within 3 months of last provider visit (or a total of 15 months).             Passed - Serum Potassium in past 12 months      Lab Results   Component Value Date    Potassium 4.4 10/03/2019             Passed - Serum Sodium in past 12 months      Lab Results   Component Value Date    Sodium 136 10/03/2019             Passed - Blood pressure on file in past 12 months     BP Readings from Last 1 Encounters:   10/03/19 138/82             Passed - Serum Creatinine in past 12 months      Creatinine   Date Value Ref Range Status   10/03/2019 0.80 0.60 - 1.10 mg/dL Final             atorvastatin  (LIPITOR) 20 MG tablet [Pharmacy Med Name: ATORVASTATIN 20MG   TAB] 90 tablet 2     Sig: TAKE 1 TABLET BY MOUTH ONCE DAILY       Statins Refill Protocol (Hmg CoA Reductase Inhibitors) Passed - 11/6/2019  8:08 AM        Passed - PCP or prescribing provider visit in past 12 months      Last office visit with prescriber/PCP: 6/23/2016 Rajiv Moe MD OR same dept: Visit date not found OR same specialty: 6/23/2016 Rajiv Moe MD  Last physical: 10/3/2019 Last MTM visit: Visit date not found   Next visit within 3 mo: Visit date not found  Next physical within 3 mo: Visit date not found  Prescriber OR PCP: Rajiv Moe MD  Last diagnosis associated with med order: 1. HTN (hypertension)  - hydroCHLOROthiazide (HYDRODIURIL) 25 MG tablet [Pharmacy Med Name: HYDROCHLOROT 25MG   TAB]; TAKE 1 TABLET BY MOUTH ONCE DAILY  Dispense: 90 tablet; Refill: 2    2. Hyperlipidemia  - atorvastatin (LIPITOR) 20 MG tablet [Pharmacy Med Name: ATORVASTATIN 20MG   TAB]; TAKE 1 TABLET BY MOUTH ONCE DAILY  Dispense: 90 tablet; Refill: 2    If protocol passes may refill for 12 months if within 3 months of last provider visit (or a total of 15 months).

## 2021-06-05 VITALS
TEMPERATURE: 98.6 F | DIASTOLIC BLOOD PRESSURE: 82 MMHG | SYSTOLIC BLOOD PRESSURE: 137 MMHG | BODY MASS INDEX: 23.62 KG/M2 | HEIGHT: 57 IN | WEIGHT: 109.5 LBS | HEART RATE: 92 BPM | RESPIRATION RATE: 12 BRPM

## 2021-06-12 NOTE — TELEPHONE ENCOUNTER
Refill Request  Did you contact pharmacy: Yes  Medication name:   Requested Prescriptions     Pending Prescriptions Disp Refills     alendronate (FOSAMAX) 70 MG tablet 12 tablet 3     Sig: TAKE 1 TABLET BY MOUTH ONCE A WEEK IN THE MORNING ON AN EMPTY STOMACH WITH  A  FULL  GLASS  OF  WATER,  30  MINUTES  BEFORE  FOOD     hydroCHLOROthiazide (HYDRODIURIL) 25 MG tablet 90 tablet 3     Sig: TAKE 1 TABLET BY MOUTH ONCE DAILY     atorvastatin (LIPITOR) 20 MG tablet 90 tablet 3     Sig: Take 1 tablet (20 mg total) by mouth daily.     naproxen (NAPROSYN) 375 MG tablet 30 tablet 2     Sig: TAKE ONE TABLET BY MOUTH TWICE DAILY WITH FOOD AS NEEDED FOR PAIN     Who prescribed the medication: Rajiv Moe MD    Requested Pharmacy: [facility]  Is patient out of medication: n/a  Patient notified refills processed in 3 business days:  n/a  Okay to leave a detailed message: yes

## 2021-06-12 NOTE — TELEPHONE ENCOUNTER
Medication Question or Clarification  Who is calling: Pharmacy fax  What medication are you calling about (include dose and sig)?: memantine (NAMENDA) 5 MG tablet  Who prescribed the medication?: Rajiv Moe MD  What is your question/concern?: Request sent via fax for [facility] pharmacy. Please confirm pharmacy, Pt is completely out. Please advise.   Requested Pharmacy: [facility]  Okay to leave a detailed message?: Yes

## 2021-06-12 NOTE — TELEPHONE ENCOUNTER
I sent it to [facility] in [facility] in [place], I think that is where she is getting her prescriptions now that she is back from [place]

## 2021-06-12 NOTE — PROGRESS NOTES
Subjective: This patient comes in for evaluation is an 87-year-old female.  Patient comes in for physical    She has had bilateral mastectomy for breast cancer on the right and 89 and on the left in 2005.    She had a flu shot earlier this fall at [facility] her code she states.  She is up-to-date on shots otherwise.    Patient has hypertension she continues on hydrochlorothiazide daily.  Blood pressure looks good I did check labs today please see below, CMP, CBC, lipid, vitamin D, TSH    Patient takes Fosamax she has low bone density.  She is on calcium and vitamin D vitamins she states she is not sure the dose I did check a vitamin D as outlined.    Also on Lipitor 20 mg a day and lipid fasting level is pending.    She does not get many aches or pains but does not take naproxen very often.    No new findings weight is stable she has been eating okay    She does get some fatigue but that is unchanged denies any shortness of breath.  She naps for about an hour a day.    Tobacco status: She  reports that she has never smoked. She has never used smokeless tobacco.    Patient Active Problem List    Diagnosis Date Noted     Low bone mass 11/09/2016     Cerumen Impaction      Hypertension        Current Outpatient Prescriptions   Medication Sig Dispense Refill     alendronate (FOSAMAX) 70 MG tablet TAKE ONE TABLET (70 MG TOTAL) BY MOUTH ONCE A WEEK. TAKE IN THE MORNING ON AN EMPTY STOMACH WITH A FULL GLASS OF WATER 1/2 HOUR BEFORE FOOD 12 tablet 2     atorvastatin (LIPITOR) 20 MG tablet TAKE ONE TABLET BY MOUTH ONCE DAILY 90 tablet 3     hydroCHLOROthiazide (HYDRODIURIL) 25 MG tablet TAKE ONE TABLET BY MOUTH ONCE DAILY 90 tablet 0     multivitamin (MULTIVITAMIN) per tablet Take 1 tablet by mouth daily.  0     naproxen (NAPROSYN) 375 MG tablet TAKE ONE TABLET BY MOUTH TWICE DAILY WITH FOOD AS NEEDED FOR PAIN 30 tablet 2     No current facility-administered medications for this visit.        ROS:   10 point review of  "systems negative other than as outlined above    Objective:    /84 (Patient Site: Left Arm, Patient Position: Sitting, Cuff Size: Adult Large)  Pulse 92  Temp 97.3  F (36.3  C) (Oral)   Resp 20  Ht 4' 9.5\" (1.461 m)  Wt 115 lb (52.2 kg)  BMI 24.45 kg/m2  Body mass index is 24.45 kg/(m^2).      General appearance no acute distress    HEENT neck without JVD oropharynx is clear pupils react normally she has had intraocular lenses placed after cataract surgery.    She does see an ophthalmologist regularly    Lungs are clear throughout no rales or rhonchi, heart was regular S1-S2 rate at 80-90.  No significant murmurs.    Abdomen is soft nontender no masses    She has had bilateral mastectomy no signs of recurrence along the incisions and no axillary or inguinal adenopathy.    Abdomen is soft bowel sounds are normal no guarding or rebound.    Patient denies any pelvic or rectal issues denies any UTI symptoms.  Deferred on pelvic exam.  Her rectal exam.    Extremities without edema pulses normal sensation normal    Skin with a number of benign warty keratoses.    Labs as outlined above pending        Assessment:  1. Routine general medical examination at a health care facility  Comprehensive Metabolic Panel   2. Hyperlipidemia  Comprehensive Metabolic Panel    Lipid Cascade FASTING   3. Hypertension  Comprehensive Metabolic Panel    Thyroid Stimulating Hormone (TSH)   4. Low bone mass  Vitamin D, Total (25-Hydroxy)   5. Fatigue  HM2(CBC w/o Differential)     Physical stable will await lab work    Continue Lipitor 20 mg a day as well as hydrochlorothiazide 25 mg a day for hyperlipidemia and hypertension    Vitamin D level pending    She had a bone density in the last couple years.        Plan: She will be contacted with results    This transcription uses voice recognition software, which may contain typographical errors.  "

## 2021-06-12 NOTE — TELEPHONE ENCOUNTER
"FYI - Status Update  Who is Calling: Child  Update: Sloan, patient's son, stated the patient usually sets up her medications by herself. Caller stated when the patient had notified the pharmacy about her refills, there was one medication that the patient had not been taking, alendronate. Caller stated he does not know how long the patient has not been taking the alendronate. Caller stated he had the patient set up her pills but then has had to call almost every morning, for the last few days, to remind her to take her medications as the patient is forgetting to take her pills. Caller stated he wanted to let Rajiv oMe MD know this because the patient has an appointment today with Rajiv Moe MD. Caller also said \"Hi\" to Rajiv Moe MD.  Okay to leave a detailed message?:  No return call needed    "

## 2021-06-12 NOTE — TELEPHONE ENCOUNTER
Please contact this patient's daughter Cuca Johansen  Her phone number [phone]    This patient is due for a annual wellness visit.  This can be set up virtually with me if Cuca is with her and can use a smart phone for video visit.    Let her know I did refill 1 month supply of them amantadine

## 2021-06-12 NOTE — PROGRESS NOTES
"Bhakti Marrero is a 89 y.o. female who is being evaluated via a billable telephone visit.      The patient has been notified of following:     \"This telephone visit will be conducted via a call between you and your physician/provider. We have found that certain health care needs can be provided without the need for a physical exam.  This service lets us provide the care you need with a short phone conversation.  If a prescription is necessary we can send it directly to your pharmacy.  If lab work is needed we can place an order for that and you can then stop by our lab to have the test done at a later time.    Telephone visits are billed at different rates depending on your insurance coverage. During this emergency period, for some insurers they may be billed the same as an in-person visit.  Please reach out to your insurance provider with any questions.    If during the course of the call the physician/provider feels a telephone visit is not appropriate, you will not be charged for this service.\"    Patient has given verbal consent to a Telephone visit? Yes    What phone number would you like to be contacted at? [phone]    Patient would like to receive their AVS by AVS Preference: Mail a copy.    Additional provider notes:     Subjective: This patient had a virtual visit, telephone, due to the coronavirus pandemic.    This patient turned 89 this year.  She feels like she is doing pretty well    They vacation in [place] for about half the year but will not be going down there this year.    Patient continues on her medication she takes alendronate for low bone mass, atorvastatin for hyperlipidemia, hydrochlorothiazide for blood pressure and Namenda for memory.    She feels her memory is about the same but does admit that she sometimes forgets things.    This patient had a lip ulceration on the right lower lip a year ago when I saw her for annual wellness visit I sent her into her dermatologist and they felt " it was actinic keratosis and treated with liquid nitrogen.    Patient wanted to try some ointment to her antibiotics on this.  We will try some Bactroban, I think she probably is developing a skin cancer and we may want her to go on to see dermatology or ENT.    We will contact her daughter regarding this.  Her daughter Cuca Yang phone number [phone].    Cuca will be contacted to schedule the lab only appointment and blood pressure check.    Patient will get labs of vitamin D CMP lipid TSH and CBC.    In addition she states she is on over-the-counter calcium and vitamin D.    She has had a bilateral mastectomy no signs of recurrence denies any lumps in her axilla or anywhere else    She has had no COVID-19 exposure or symptoms.    She denies any chest pressure.  Staying active.        Tobacco status: She  reports that she has never smoked. She has never used smokeless tobacco.    Patient Active Problem List    Diagnosis Date Noted     Low bone mass 11/09/2016     Cerumen Impaction      Hypertension        Current Outpatient Medications   Medication Sig Dispense Refill     alendronate (FOSAMAX) 70 MG tablet TAKE 1 TABLET BY MOUTH ONCE A WEEK IN THE MORNING ON AN EMPTY STOMACH WITH  A  FULL  GLASS  OF  WATER,  30  MINUTES  BEFORE  FOOD 12 tablet 0     aspirin 81 MG EC tablet Take 81 mg by mouth daily.       atorvastatin (LIPITOR) 20 MG tablet Take 1 tablet (20 mg total) by mouth daily. 90 tablet 0     hydroCHLOROthiazide (HYDRODIURIL) 25 MG tablet TAKE 1 TABLET BY MOUTH ONCE DAILY 90 tablet 0     memantine (NAMENDA) 5 MG tablet Take 1 tablet (5 mg total) by mouth daily. 30 tablet 0     multivitamin (MULTIVITAMIN) per tablet Take 1 tablet by mouth daily.  0     naproxen (NAPROSYN) 375 MG tablet TAKE ONE TABLET BY MOUTH TWICE DAILY WITH FOOD AS NEEDED FOR PAIN 30 tablet 0     mupirocin (BACTROBAN) 2 % ointment Apply to affected area 3 times daily 15 g 0     No current facility-administered medications for this  visit.        ROS:   10 point review of systems positive as outlined above otherwise negative.  She did have a bone density back in 2017.        Objective:    There were no vitals taken for this visit.  There is no height or weight on file to calculate BMI.    General: No acute distress    Carries on conversation normally seems to be engaged in coherent with conversation.    HEENT denies any hearing problems or vision problems    Neck without adenopathy    Right lower lip has a small ulceration she thinks it might be a little bigger than the past.    We have some Bactroban ointment.  Likely will need to go on to see ENT or dermatology.    Lungs: Nonlabored breathing no wheezing.    Heart: No palpitations or rapid heart rate    Abdomen nontender, denies any bloating    Extremities denies any edema.    Skin as noted on her lip otherwise she denies any additional problems.    Neurologic no focal weakness or numbness, memory issues present as we discussed above.    She will come in for labs for vitamin D CMP lipid TSH and CBC.    She is up-to-date on immunizations she did get a flu shot this year        Results for orders placed or performed in visit on 10/03/19   Vitamin D, Total (25-Hydroxy)   Result Value Ref Range    Vitamin D, Total (25-Hydroxy) 30.1 30.0 - 80.0 ng/mL   Comprehensive Metabolic Panel   Result Value Ref Range    Sodium 136 136 - 145 mmol/L    Potassium 4.4 3.5 - 5.0 mmol/L    Chloride 96 (L) 98 - 107 mmol/L    CO2 28 22 - 31 mmol/L    Anion Gap, Calculation 12 5 - 18 mmol/L    Glucose 96 70 - 125 mg/dL    BUN 10 8 - 28 mg/dL    Creatinine 0.80 0.60 - 1.10 mg/dL    GFR MDRD Af Amer >60 >60 mL/min/1.73m2    GFR MDRD Non Af Amer >60 >60 mL/min/1.73m2    Bilirubin, Total 0.7 0.0 - 1.0 mg/dL    Calcium 10.3 8.5 - 10.5 mg/dL    Protein, Total 7.4 6.0 - 8.0 g/dL    Albumin 4.1 3.5 - 5.0 g/dL    Alkaline Phosphatase 66 45 - 120 U/L    AST 19 0 - 40 U/L    ALT 14 0 - 45 U/L   Lipid Cascade FASTING   Result  Value Ref Range    Cholesterol 173 <=199 mg/dL    Triglycerides 155 (H) <=149 mg/dL    HDL Cholesterol 66 >=50 mg/dL    LDL Calculated 76 <=129 mg/dL    Patient Fasting > 8hrs? Yes    Thyroid Stimulating Hormone (TSH)   Result Value Ref Range    TSH 1.88 0.30 - 5.00 uIU/mL   HM2(CBC w/o Differential)   Result Value Ref Range    WBC 6.8 4.0 - 11.0 thou/uL    RBC 3.77 (L) 3.80 - 5.40 mill/uL    Hemoglobin 12.3 12.0 - 16.0 g/dL    Hematocrit 35.9 35.0 - 47.0 %    MCV 95 80 - 100 fL    MCH 32.5 27.0 - 34.0 pg    MCHC 34.2 32.0 - 36.0 g/dL    RDW 11.2 11.0 - 14.5 %    Platelets 230 140 - 440 thou/uL    MPV 7.6 7.0 - 10.0 fL       Assessment:  1. Hypertension  Comprehensive Metabolic Panel   2. Lip ulcer  mupirocin (BACTROBAN) 2 % ointment   3. Low bone mass  HM2(CBC w/o Differential)   4. Memory loss  Thyroid Stimulating Hormone (TSH)   5. Hyperlipidemia, unspecified hyperlipidemia type  Comprehensive Metabolic Panel    Lipid Cascade FASTING   6. Vitamin D deficiency  Vitamin D, Total (25-Hydroxy)     Hypertension on hydrochlorothiazide check blood pressure when she comes in check CMP    Lip ulcer question skin cancer if not resolved with the Bactroban will need to see specialist    Memory loss continue Namenda    Low bone mass continue on the alendronate calcium and vitamin D will also check vitamin D level.    Hyperlipidemia on atorvastatin check lipid and liver function.        Plan: As discussed above, her daughter will be contacted with results please see phone number above    This transcription uses voice recognition software, which may contain typographical errors.      Phone call duration: 21 minutes from 11:34 AM through 11:55 AM    Rajiv Moe MD

## 2021-06-12 NOTE — TELEPHONE ENCOUNTER
Called and spoke with patient's daughter Cuca Johansen to schedule an video appt for AWV. Cuca  will have to call back and make the appt after she take a look at her schedule. Postponing it out to 1 week and will try again.

## 2021-06-12 NOTE — TELEPHONE ENCOUNTER
Please let the patient's son know that I do want the patient to take that is once a week alendronate 70 mg.  She needs to take it on an empty stomach when she is standing upright.    This is for low bone density.    I sent another prescription for this to her pharmacy [facility] in [place]

## 2021-06-13 NOTE — TELEPHONE ENCOUNTER
Refill Approved    Rx renewed per Medication Renewal Policy. Medication was last renewed on 10/29/20, last OV 10/26/20.    Kiara Fierro, [facility] Triage/Med Refill 11/15/2020     Requested Prescriptions   Pending Prescriptions Disp Refills     memantine (NAMENDA) 5 MG tablet 30 tablet 5     Sig: Take 1 tablet (5 mg total) by mouth daily.       Cholinesterase Inhibitor/Anti-Alzheimer Agent Refill Protocol Passed - 11/11/2020  9:24 AM        Passed - Visit with PCP or prescribing provider visit in last 6 months or next 3 months     Last office visit with prescriber/PCP: Visit date not found OR same dept: Visit date not found OR same specialty: Visit date not found Last physical: Visit date not found Last MTM visit: Visit date not found     Next appt within 3 mo: Visit date not found  Next physical within 3 mo: Visit date not found  Prescriber OR PCP: Rajiv Moe MD  Last diagnosis associated with med order: 1. Memory loss  - memantine (NAMENDA) 5 MG tablet; Take 1 tablet (5 mg total) by mouth daily.  Dispense: 30 tablet; Refill: 5    If protocol passes may refill for 6 months if within 3 months of last provider visit (or a total of 9 months).

## 2021-06-13 NOTE — TELEPHONE ENCOUNTER
FYI - Status Update  Who is Calling: Child  Update: Sloan, patient's son, is asking for a 90 day supply.  Okay to leave a detailed message?:  No

## 2021-06-14 NOTE — TELEPHONE ENCOUNTER
RN cannot approve Refill Request    RN can NOT refill this medication med is not covered by policy/route to provider, Protocol failed and NO refill given and medication not on med list. Last office visit: Visit date not found Last Physical: 10/3/2019 Last MTM visit: Visit date not found Last visit same specialty: Visit date not found.  Next visit within 3 mo: Visit date not found  Next physical within 3 mo: Visit date not found      Tess Herron, [facility] Triage/Med Refill 2/2/2021    Requested Prescriptions   Pending Prescriptions Disp Refills     mupirocin (BACTROBAN) 2 % ointment [Pharmacy Med Name: MUPIROCIN 2% OINTMENT] 22 g 0     Sig: APPLY TO AFFECTED AREA 3 TIMES A DAY       There is no refill protocol information for this order        atorvastatin (LIPITOR) 20 MG tablet [Pharmacy Med Name: ATORVASTATIN 20 MG TABLET] 90 tablet 0     Sig: TAKE 1 TABLET BY MOUTH EVERY DAY       Statins Refill Protocol (Hmg CoA Reductase Inhibitors) Passed - 2/2/2021  8:01 AM        Passed - PCP or prescribing provider visit in past 12 months      Last office visit with prescriber/PCP: Visit date not found OR same dept: Visit date not found OR same specialty: Visit date not found  Last physical: 10/3/2019 Last MTM visit: Visit date not found   Next visit within 3 mo: Visit date not found  Next physical within 3 mo: Visit date not found  Prescriber OR PCP: Rajiv Moe MD  Last diagnosis associated with med order: 1. Lip ulcer  - mupirocin (BACTROBAN) 2 % ointment [Pharmacy Med Name: MUPIROCIN 2% OINTMENT]; APPLY TO AFFECTED AREA 3 TIMES A DAY  Dispense: 22 g; Refill: 0    2. Hyperlipidemia  - atorvastatin (LIPITOR) 20 MG tablet [Pharmacy Med Name: ATORVASTATIN 20 MG TABLET]; TAKE 1 TABLET BY MOUTH EVERY DAY  Dispense: 90 tablet; Refill: 0    3. HTN (hypertension)  - hydroCHLOROthiazide (HYDRODIURIL) 25 MG tablet [Pharmacy Med Name: HYDROCHLOROTHIAZIDE 25 MG TAB]; TAKE 1 TABLET BY MOUTH EVERY DAY  Dispense: 90 tablet;  Refill: 0    4. Hip pain  - naproxen (NAPROSYN) 375 MG tablet [Pharmacy Med Name: NAPROXEN 375 MG TABLET]; TAKE ONE TABLET BY MOUTH TWICE DAILY WITH FOOD AS NEEDED FOR PAIN  Dispense: 30 tablet; Refill: 0    If protocol passes may refill for 12 months if within 3 months of last provider visit (or a total of 15 months).                hydroCHLOROthiazide (HYDRODIURIL) 25 MG tablet [Pharmacy Med Name: HYDROCHLOROTHIAZIDE 25 MG TAB] 90 tablet 0     Sig: TAKE 1 TABLET BY MOUTH EVERY DAY       Diuretics/Combination Diuretics Refill Protocol  Failed - 2/2/2021  8:01 AM        Failed - Serum Potassium in past 12 months      No results found for: LN-POTASSIUM          Failed - Serum Sodium in past 12 months      No results found for: LN-SODIUM          Failed - Blood pressure on file in past 12 months     BP Readings from Last 1 Encounters:   10/03/19 138/82             Failed - Serum Creatinine in past 12 months      Creatinine   Date Value Ref Range Status   10/03/2019 0.80 0.60 - 1.10 mg/dL Final             Passed - Visit with PCP or prescribing provider visit in past 12 months     Last office visit with prescriber/PCP: Visit date not found OR same dept: Visit date not found OR same specialty: Visit date not found  Last physical: 10/3/2019 Last MTM visit: Visit date not found   Next visit within 3 mo: Visit date not found  Next physical within 3 mo: Visit date not found  Prescriber OR PCP: Rajiv Moe MD  Last diagnosis associated with med order: 1. Lip ulcer  - mupirocin (BACTROBAN) 2 % ointment [Pharmacy Med Name: MUPIROCIN 2% OINTMENT]; APPLY TO AFFECTED AREA 3 TIMES A DAY  Dispense: 22 g; Refill: 0    2. Hyperlipidemia  - atorvastatin (LIPITOR) 20 MG tablet [Pharmacy Med Name: ATORVASTATIN 20 MG TABLET]; TAKE 1 TABLET BY MOUTH EVERY DAY  Dispense: 90 tablet; Refill: 0    3. HTN (hypertension)  - hydroCHLOROthiazide (HYDRODIURIL) 25 MG tablet [Pharmacy Med Name: HYDROCHLOROTHIAZIDE 25 MG TAB]; TAKE 1 TABLET BY  MOUTH EVERY DAY  Dispense: 90 tablet; Refill: 0    4. Hip pain  - naproxen (NAPROSYN) 375 MG tablet [Pharmacy Med Name: NAPROXEN 375 MG TABLET]; TAKE ONE TABLET BY MOUTH TWICE DAILY WITH FOOD AS NEEDED FOR PAIN  Dispense: 30 tablet; Refill: 0    If protocol passes may refill for 12 months if within 3 months of last provider visit (or a total of 15 months).                naproxen (NAPROSYN) 375 MG tablet [Pharmacy Med Name: NAPROXEN 375 MG TABLET] 30 tablet 0     Sig: TAKE ONE TABLET BY MOUTH TWICE DAILY WITH FOOD AS NEEDED FOR PAIN       There is no refill protocol information for this order

## 2021-06-15 NOTE — PROGRESS NOTES
ASSESSMENT/PLAN:  1. Abnormal white blood cell  Morphology,Smear Review (MORP)    Manual Differential    Peripheral Blood Smear, Path Review   2. Bilateral malignant neoplasm of breast in female, unspecified estrogen receptor status, unspecified site of breast (H)     3. Anemia, unspecified type  HM1(CBC and Differential)   4. Hospital discharge follow-up     5. Chest wall pain         This is a 89 yo female with recent hospitalization - here for follow up:  I have reviewed available hospital records, consults, notes, discharge summary as well as imaging and lab results.  In addition I have reviewed her medication list and made any adjustments as indicated in orders.      1.  Chest Wall pain - negative cardiac workup  2.  Abnormal White blood cell count - review of labs suggests some immature white blood cells - will check peripheral smear; h/o anemia  3.  Breast Cancer - no signs of recurrence  Return in about 1 month (around 3/16/2021) for Recheck.      There are no discontinued medications.  There are no Patient Instructions on file for this visit.    Chief Complaint:  Chief Complaint   Patient presents with     Hospital Visit Follow Up       HPI:   Bhakti Marrero is a 89 y.o. female c/o  Called paramedics due to left sided chest pain  Last week   Admitted to [facility]  Slight pneumonia, slight UTI    Has nurse, OT/PT - to regain her strength  Has some in-home care - to help with getting off toilet   Was more dizzy      PMH:   Patient Active Problem List    Diagnosis Date Noted     Chest wall pain 02/21/2021     Bilateral malignant neoplasm of breast in female (H) 02/16/2021     Right lower lobe pulmonary nodule 02/11/2021     Low bone mass 11/09/2016     Cerumen Impaction      Hypertension      No past medical history on file.  No past surgical history on file.  Social History     Socioeconomic History     Marital status:      Spouse name: Not on file     Number of children: Not on file     Years of  education: Not on file     Highest education level: Not on file   Occupational History     Not on file   Social Needs     Financial resource strain: Not on file     Food insecurity     Worry: Not on file     Inability: Not on file     Transportation needs     Medical: Not on file     Non-medical: Not on file   Tobacco Use     Smoking status: Never Smoker     Smokeless tobacco: Never Used   Substance and Sexual Activity     Alcohol use: Yes     Alcohol/week: 6.0 - 7.0 standard drinks     Types: 6 - 7 Glasses of wine per week     Drug use: No     Sexual activity: Not on file   Lifestyle     Physical activity     Days per week: Not on file     Minutes per session: Not on file     Stress: Not on file   Relationships     Social connections     Talks on phone: Not on file     Gets together: Not on file     Attends Oriental orthodox service: Not on file     Active member of club or organization: Not on file     Attends meetings of clubs or organizations: Not on file     Relationship status: Not on file     Intimate partner violence     Fear of current or ex partner: Not on file     Emotionally abused: Not on file     Physically abused: Not on file     Forced sexual activity: Not on file   Other Topics Concern     Not on file   Social History Narrative     Not on file     No family history on file.    Meds:    Current Outpatient Medications:      alendronate (FOSAMAX) 70 MG tablet, TAKE 1 TABLET BY MOUTH ONCE A WEEK IN THE MORNING ON AN EMPTY STOMACH WITH  A  FULL  GLASS  OF  WATER,  30  MINUTES  BEFORE  FOOD, Disp: 12 tablet, Rfl: 3     aspirin 81 MG EC tablet, Take 81 mg by mouth daily., Disp: , Rfl:      atorvastatin (LIPITOR) 20 MG tablet, TAKE 1 TABLET BY MOUTH EVERY DAY, Disp: 90 tablet, Rfl: 0     cholecalciferol, vitamin D3, 50 mcg (2,000 unit) Tab, 1 p.o. daily, Disp:  , Rfl: 0     hydroCHLOROthiazide (HYDRODIURIL) 25 MG tablet, TAKE 1 TABLET BY MOUTH EVERY DAY, Disp: 90 tablet, Rfl: 0     memantine (NAMENDA) 5 MG tablet,  "Take 1 tablet (5 mg total) by mouth daily., Disp: 90 tablet, Rfl: 3     mupirocin (BACTROBAN) 2 % ointment, APPLY TO AFFECTED AREA 3 TIMES A DAY, Disp: 22 g, Rfl: 0     naproxen (NAPROSYN) 375 MG tablet, TAKE ONE TABLET BY MOUTH TWICE DAILY WITH FOOD AS NEEDED FOR PAIN, Disp: 30 tablet, Rfl: 0     multivitamin (MULTIVITAMIN) per tablet, Take 1 tablet by mouth daily., Disp: , Rfl: 0    Allergies:  No Known Allergies    ROS:  Pertinent positives as noted in HPI; otherwise 12 point ROS negative.      Physical Exam:  EXAM:  /82 (Patient Site: Right Arm, Patient Position: Sitting, Cuff Size: Adult Small)   Pulse 92   Temp 98.6  F (37  C) (Temporal)   Resp 12   Ht 4' 9.48\" (1.46 m)   Wt 109 lb 8 oz (49.7 kg)   BMI 23.30 kg/m     Gen:  NAD, appears well, well-hydrated  HEENT:  TMs nl, oropharynx benign, nasal mucosa nl, conjunctiva clear  Neck:  Supple, no adenopathy, no thyromegaly, no carotid bruits, no JVD  Lungs:  Clear to auscultation bilaterally  Cor:  RRR no murmur  Abd:  Soft, nontender, BS+, no masses, no guarding or rebound, no HSM  Extr:  Neg.  Neuro:  No asymmetry  Skin:  Warm/dry        Results:  Results for orders placed or performed in visit on 02/16/21   Morphology,Smear Review (MORP)   Result Value Ref Range    Pathology, Smear Review See Separate Pathology Report (!) (none)    WBC 14.1 (H) 4.0 - 11.0 thou/uL    RBC 3.57 (L) 3.80 - 5.40 mill/uL    Hemoglobin 11.2 (L) 12.0 - 16.0 g/dL    Hematocrit 34.8 (L) 35.0 - 47.0 %    MCV 98 80 - 100 fL    MCH 31.4 27.0 - 34.0 pg    MCHC 32.2 32.0 - 36.0 g/dL    RDW 13.2 11.0 - 14.5 %    Platelets 240 140 - 440 thou/uL    MPV 11.8 8.5 - 12.5 fL   Manual Differential   Result Value Ref Range    Total Neutrophils % 70 50 - 70 %    Lymphocytes % 13 (L) 20 - 40 %    Monocytes % 18 (H) 2 - 10 %    Eosinophils %  1 0 - 6 %    Basophils % 0 0 - 2 %    Immature Granulocyte % - Manual 0 <=0 %    Total Neutrophils Absolute 9.8 (H) 2.0 - 7.7 thou/ul    Lymphocytes " Absolute 1.8 0.8 - 4.4 thou/uL    Monocytes Absolute 2.5 (H) 0.0 - 0.9 thou/uL    Eosinophils Absolute 0.1 0.0 - 0.4 thou/uL    Basophils Absolute 0.0 0.0 - 0.2 thou/uL    Immature Granulocyte Absolute - Manual 0.0 <=0.0 thou/uL    Platelet Estimate Normal Normal   Peripheral Blood Smear, Path Review   Result Value Ref Range    Case Report       Peripheral Blood Morphology Report                Case: [id number]                                   Authorizing Provider:  Paty,         Collected:           02/16/2021 [facility]                                     MD Danielle                                                                 Ordering Location:     [facility]   Received:            02/17/2021 [address] [place] [address]                                                                  Pathologist:           Franky Zuñiga MD                                                        Specimen:    Peripheral Blood                                                                           Final Diagnosis       PERIPHERAL BLOOD:      -  MIXED LEUKOCYTOSIS      -  NORMOCHROMIC-NORMOCYTIC ANEMIA    Comment       The clinical history has been reviewed. The causes of reactive neutrophilia are numerous and range from infection to metabolic defects. Bacterial infections are the predominant cause of neutrophilia; other causes include therapeutic or endogenous drugs/hormones, acute stress, acute tissue necrosis and other infectious/inflammatory processes, including collagen vascular disorders and autoimmune disease. Absolute neutrophilia is seen in a variety of hematopoietic neoplasms, especially myeloproliferative disorders. Recommend clinical correlation; consider repeat CBC after resolution of any possible infection. If a diagnosis of CML is suspected, evaluation of the peripheral blood for the presence of the [place] chromosome and/or the BCR/ABL fusion gene is  suggested.     Reactive monocytosis can be seen in chronic infections, collagen vascular diseases, immune-mediated gastrointestinal disorders, sarcoidosis, hemolytic anemia, and recovery phase of agranulocytosis, among  others.    Normocytic anemia can be caused by multiple etiologies including intrinsic bone marrow disease, renal disease, hepatic disease, endocrine disease, anemia of chronic disease, post-hemorrhagic anemia, and bone marrow infiltration by tumors. Recommend clinical correlation and follow-up.    Clinical Information D72.9     Peripheral Smear       Red blood cells are decreased in number but overall normochromic and normocytic. Anisopoikilocytosis, polychromasia, and rouleaux formation are not prominent.    The white blood cell count and differential appear as reported on the CBC. Leukocytes are increased in number and demonstrate an absolute neutrophilia/monocytosis. No blasts or dysplastic changes are identified.    Platelets are normal in number and appearance.    Charges CPT: 57746  ICD-10: D64.9

## 2021-06-15 NOTE — TELEPHONE ENCOUNTER
Okay for orders.    Patient should also have a follow-up video virtual visit with me later this week, if not already scheduled.

## 2021-06-15 NOTE — TELEPHONE ENCOUNTER
Home care nurse is callingc due to hearing extra beats when listening to her heart. Asymptomatic at this time. Thinking it may be caffine induced ? Sats 96.   She had cafinated coffee today, has been drinking decafinated.    There was a verbal Tylenol order 1 tablet 3 times a day, not sure of the milligrams was given to her daughter.  The home care needs a faxed order to [phone].    Verbal order on Feb 14, requesting home care orders. Orders approved at 4:29, but there was no communication to the Nurses for that.  Is it possible to fax that order to them?  Fax [phone] at [facility].    Emelina Bedolla RN, Nurse Advisor

## 2021-06-15 NOTE — TELEPHONE ENCOUNTER
"Do we have permission to talk to the daughter?  I have sent a letter with the results, but here is my summary from that letter:  \"Your blood test looks okay - on your previous test, they mentioned some \"funny looking cells\", so I wanted them to look closer under the microscope.  It all checks out okay!!\"    "

## 2021-06-15 NOTE — TELEPHONE ENCOUNTER
DOD: Iman from [facility] called and wanted to let Dr. Moe know that she had went out to the pt's home for the nurse visit and wanted to report that while doing pt's vitals today, she noticed again that there was 2 to 3 extra beats within a min. Nurse did let pt know to eat less chocolate and less caffeine. Pt is asymptomatic and vitals are ok. Iman asked if the pt needs to get an EKG done because of the skipped heart beats. If yes, will call the patient to come into clinic for it.    Per nurse Iman wants a call back to update her also.  Call back # : [phone]

## 2021-06-15 NOTE — TELEPHONE ENCOUNTER
Called and relayed the message below to the patient's daughter, Cuca regarding the tyleno. No further concerns or questions.

## 2021-06-15 NOTE — TELEPHONE ENCOUNTER
Regina has inform Nurse about the verbal orders.     Dr. Moe Are you willing to prescribe tylenol?  Or should they buy it otc?

## 2021-06-15 NOTE — TELEPHONE ENCOUNTER
Okay for verbal orders.    They can use over-the-counter Tylenol 500 mg 1 to 2 tablets, 3 times daily, as needed fever or pain

## 2021-06-15 NOTE — TELEPHONE ENCOUNTER
Called and relayed the message below to the patient's daughter, Cuca (ok to speak with per JAY signed in chart). No further concerns or questions at this time.

## 2021-06-15 NOTE — TELEPHONE ENCOUNTER
"[facility]ene at [phone]  Called and left a message to return call. Upon call back please relay message from Vicky.     \" Okay to relay message\"    "

## 2021-06-15 NOTE — TELEPHONE ENCOUNTER
Please contact the nurse, Aurora, and let her know we reviewed the vital signs and her oxygen level and the skipped beats.    No change in medication    Sounds good to update us again on March 11

## 2021-06-15 NOTE — TELEPHONE ENCOUNTER
Reason for Call:  Medication or medication refill:    Do you use a Mobile Pharmacy?  Name of the pharmacy and phone number for the current request: n/a    Name of the medication requested: tylenol 500 mg  Also the other requests from jose were addressed and we called many time to give her a verbal and phone rang busy everytime.    Other request: n/a    Can we leave a detailed message on this number? Yes    Phone number patient can be reached at: Home number on file [phone] (home)    Best Time: asap please    Call taken on 2/22/2021 at 4:05 PM by Radha Alcantara

## 2021-06-15 NOTE — TELEPHONE ENCOUNTER
I reviewed the patient's hospital stay and echocardiogram.  Echocardiogram was fairly normal.  Normal ejection fraction.    Have the home nurse continue to check vitals, and update us on Monday or Tuesday (March 1 or 2).      Might consider beta-blocker pending vital signs, and any symptoms she might have.

## 2021-06-15 NOTE — TELEPHONE ENCOUNTER
I am not sure what I am supposed to advise about.  It looks like nurse told her my recommendations from yesterday, and the home nurse is going to go out and assess her and get back to us

## 2021-06-15 NOTE — TELEPHONE ENCOUNTER
jose the nurse called back with vitals.Patients bp sitting 108/68,  standing 110/70, pulse 88.   They are encouraging patient to drink more water and less caffeine.  oxygrn stat  98 % still asymptomatic, few extra beats and some skipped beats. .  jose will visit patient again march 11 and if we want she can call again with vitals.  Patient will be moving to [facility] living at [address] [place] [address] in [place] # [phone]  jose would like a call back today please so she knows we got these messages.

## 2021-06-15 NOTE — TELEPHONE ENCOUNTER
Reason for Call:  Other Updates     Detailed comments: Kenyetta called from [facility] to report patient's vitals. BP on left arm while sitting was 105/60 and pulse was 103, taken while standing was 98/60 on same arm. Also wanted to report that she did not hear any skipped beat or extra beat.    Phone Number Patient can be reached at: Other phone number:  [phone]    Best Time: any    Can we leave a detailed message on this number?: Yes    Call taken on 3/11/2021 at 11:14 AM by Demetris Love

## 2021-06-15 NOTE — TELEPHONE ENCOUNTER
Called and got a verbal ok from Dr. Moe for Home Care to proceed with the following order requested below.

## 2021-06-15 NOTE — TELEPHONE ENCOUNTER
patient can take over-the-counter Tylenol 500 mg, 1 to 2 tablets 3 times a day as needed for pain or fever.    Also please find out exactly what orders they need, skilled nursing for med set up?,  PT OT??    So that I can dictate a letter for the orders and then we can fax it

## 2021-06-15 NOTE — TELEPHONE ENCOUNTER
Called and left message for [facility] to return call. Okay to relay the message below, attempt #2.

## 2021-06-15 NOTE — TELEPHONE ENCOUNTER
Reason for Call: Request for an order or referral:home care needs verbal order    Order or referral being requested: skilled g2x week x 2 1 x week x 3 also pt and ot     Date needed: as soon as possible    Has the patient been seen by the PCP for this problem? YES and NO    Additional comments: just verbal order please     Phone number Patient can be reached at:  Other phone number:  [phone] ok to leave message*    Best Time:  asap    Can we leave a detailed message on this number?  Yes    Call taken on 2/15/2021 at 10:42 AM by Radha Alcantara

## 2021-06-15 NOTE — TELEPHONE ENCOUNTER
Aurora RN with [facility] is calling and states that Aurora phoned on Friday and has not heard anything as of yet.  FNA relayed messages from MD Montejo and MD Moe and RN will phone back as she is on the way to [facility] for a visit.    COVID 19 Nurse Triage Plan/Patient Instructions    Please be aware that novel coronavirus (COVID-19) may be circulating in the community. If you develop symptoms such as fever, cough, or SOB or if you have concerns about the presence of another infection including coronavirus (COVID-19), please contact your health care provider or visit  [email or web address].    Disposition/Instructions    Home care recommended. Follow home care protocol based instructions.    Thank you for taking steps to prevent the spread of this virus.  o Limit your contact with others.  o Wear a simple mask to cover your cough.  o Wash your hands well and often.    Resources    [facility]: About COVID-19: [email or web address]    CDC: What to Do If You're Sick: www.cdc.gov/coronavirus/2019-ncov/about/steps-when-sick.html    CDC: Ending Home Isolation: www.cdc.gov/coronavirus/2019-ncov/hcp/disposition-in-home-patients.html     CDC: Caring for Someone: www.cdc.gov/coronavirus/2019-ncov/if-you-are-sick/care-for-someone.html     [facility]: Interim Guidance for Hospital Discharge to Home: www.health.[facility].[place].us/diseases/coronavirus/hcp/hospdischarge.pdf    [facility] clinical trials (COVID-19 research studies): clinicalaffairs.[facility]-clinical-trials     Below are the COVID-19 hotlines at the [place] Department of Health ([facility]). Interpreters are available.   o For health questions: Call 526-955-5595 or 1-807.107.8143 (7 a.m. to 7 p.m.)  o For questions about schools and childcare: Call 745-871-2511 or 1-346.610.4473 (7 a.m. to 7 p.m.)     Reason for Disposition    Information only question and nurse able to answer    Additional Information    Negative:  Nursing judgment    Negative: Nursing judgment    Negative: Nursing judgment    Negative: Nursing judgment    Protocols used: NO PROTOCOL AVAILABLE - INFORMATION ONLY-A-[place]

## 2021-06-15 NOTE — TELEPHONE ENCOUNTER
From 2/15/2021 encounter   Nurse is looking for this order   Home care asking for verbal orders please  Charlene at [phone]  Pt and ot, for lower extremitiies, 2xweek x 2 1 times a week x 1    Vicky MOMIN had already approve this order. We attempted to call the nurse.     So in the letter they will need Okay to take OTC tylenol and the verbal orders as listed above.     Once letter is complete will fax letter to [phone]

## 2021-06-15 NOTE — TELEPHONE ENCOUNTER
Daughter, Cuca called and want to know patient's test results from 2/16/21. Please review and advise.

## 2021-06-15 NOTE — TELEPHONE ENCOUNTER
Home care asking for verbal orders please  Charlene at [phone]  Pt and ot, for lower extremitiies, 2xweek x 2 1 times a week x 1

## 2021-06-16 PROBLEM — R91.1 RIGHT LOWER LOBE PULMONARY NODULE: Status: ACTIVE | Noted: 2021-02-11

## 2021-06-16 PROBLEM — C50.912 BILATERAL MALIGNANT NEOPLASM OF BREAST IN FEMALE (H): Status: ACTIVE | Noted: 2021-02-16

## 2021-06-16 PROBLEM — R07.89 CHEST WALL PAIN: Status: ACTIVE | Noted: 2021-02-21

## 2021-06-16 PROBLEM — C50.911 BILATERAL MALIGNANT NEOPLASM OF BREAST IN FEMALE (H): Status: ACTIVE | Noted: 2021-02-16

## 2021-06-16 NOTE — TELEPHONE ENCOUNTER
This discussion could wait until Dr. Moe's return - please assist them in making an appointment with Dr. Moe.

## 2021-06-16 NOTE — TELEPHONE ENCOUNTER
Patient's daughter, Cuca called and would like to know if there is another alternative to her osteoporosis medication? Patient takes this medication once a week on Monday and she is forgetting to take this medication. Please call Cuca to advise.     PCP is out of office this week, routing to DOD pool.

## 2021-06-17 NOTE — TELEPHONE ENCOUNTER
RN cannot approve Refill Request: Hctz    RN can NOT refill this medication PCP messaged that patient is overdue for Labs. Last office visit: Visit date not found Last Physical: 10/3/2019 Last MTM visit: Visit date not found Last visit same specialty: 2/16/2021 Danielle Newman MD.  Next visit within 3 mo: Visit date not found  Next physical within 3 mo: Visit date not found       Refill Approved: Atorvastatin     Rx renewed per Medication Renewal Policy. Medication was last renewed on 2/2/2021.  Last office visit: 2/16/2021 with Dr ELADIO Newman @ [facility]    Kae Boyle, [facility] Triage/Med Refill 4/27/2021     Requested Prescriptions   Pending Prescriptions Disp Refills     hydroCHLOROthiazide (HYDRODIURIL) 25 MG tablet [Pharmacy Med Name: HYDROCHLOROTHIAZIDE 25 MG TAB] 90 tablet 0     Sig: TAKE 1 TABLET BY MOUTH EVERY DAY       Diuretics/Combination Diuretics Refill Protocol  Failed - 4/27/2021  3:29 PM        Failed - Serum Potassium in past 12 months      No results found for: LN-POTASSIUM          Failed - Serum Sodium in past 12 months      No results found for: LN-SODIUM          Failed - Serum Creatinine in past 12 months      Creatinine   Date Value Ref Range Status   10/03/2019 0.80 0.60 - 1.10 mg/dL Final             Passed - Visit with PCP or prescribing provider visit in past 12 months     Last office visit with prescriber/PCP: Visit date not found OR same dept: 2/16/2021 Danielle Newman MD OR same specialty: 2/16/2021 Danielle Newman MD  Last physical: 10/3/2019 Last MTM visit: Visit date not found   Next visit within 3 mo: Visit date not found  Next physical within 3 mo: Visit date not found  Prescriber OR PCP: Rajiv Moe MD  Last diagnosis associated with med order: 1. HTN (hypertension)  - hydroCHLOROthiazide (HYDRODIURIL) 25 MG tablet [Pharmacy Med Name: HYDROCHLOROTHIAZIDE 25 MG TAB]; TAKE 1 TABLET BY MOUTH EVERY DAY  Dispense: 90 tablet;  Refill: 0    2. Hyperlipidemia  - atorvastatin (LIPITOR) 20 MG tablet [Pharmacy Med Name: ATORVASTATIN 20 MG TABLET]; TAKE 1 TABLET BY MOUTH EVERY DAY  Dispense: 90 tablet; Refill: 0    If protocol passes may refill for 12 months if within 3 months of last provider visit (or a total of 15 months).             Passed - Blood pressure on file in past 12 months     BP Readings from Last 1 Encounters:   03/11/21 105/60                atorvastatin (LIPITOR) 20 MG tablet [Pharmacy Med Name: ATORVASTATIN 20 MG TABLET] 90 tablet 0     Sig: TAKE 1 TABLET BY MOUTH EVERY DAY       Statins Refill Protocol (Hmg CoA Reductase Inhibitors) Passed - 4/27/2021  3:29 PM        Passed - PCP or prescribing provider visit in past 12 months      Last office visit with prescriber/PCP: Visit date not found OR same dept: 2/16/2021 Danielle Newman MD OR same specialty: 2/16/2021 Danielle Newman MD  Last physical: 10/3/2019 Last MTM visit: Visit date not found   Next visit within 3 mo: Visit date not found  Next physical within 3 mo: Visit date not found  Prescriber OR PCP: Rajiv Moe MD  Last diagnosis associated with med order: 1. HTN (hypertension)  - hydroCHLOROthiazide (HYDRODIURIL) 25 MG tablet [Pharmacy Med Name: HYDROCHLOROTHIAZIDE 25 MG TAB]; TAKE 1 TABLET BY MOUTH EVERY DAY  Dispense: 90 tablet; Refill: 0    2. Hyperlipidemia  - atorvastatin (LIPITOR) 20 MG tablet [Pharmacy Med Name: ATORVASTATIN 20 MG TABLET]; TAKE 1 TABLET BY MOUTH EVERY DAY  Dispense: 90 tablet; Refill: 0    If protocol passes may refill for 12 months if within 3 months of last provider visit (or a total of 15 months).

## 2021-06-17 NOTE — TELEPHONE ENCOUNTER
Reason for Call: Request for an order or referral:    Order or referral being requested: ct scan    Date needed: at your convenience    Has the patient been seen by the PCP for this problem? YES    Additional comments: pts daughter calling saying when they had the last appt we said pt should have a ct scan and it was never ordered.    Phone number Patient can be reached at:  Other phone number:  [phone]    Best Time:  any    Can we leave a detailed message on this number?  Yes    Call taken on 4/27/2021 at 2:09 PM by Radha Alcantara

## 2021-06-21 NOTE — LETTER
Letter by Rajiv Moe MD at      Author: Rajiv Moe MD Service: -- Author Type: --    Filed:  Encounter Date: 2/25/2021 Status: (Other)         Re: [facility]    YOB: 1930        Orders:    Home care nursing, evaluate and treat    PT and OT eval and treat for lower extremity weakness    Okay for over-the-counter Tylenol 500 mg 1 to 2 tablets 3 times daily as needed fever or pain            Rajiv Moe MD    Electronically signed on 2/25/2021

## 2021-06-21 NOTE — LETTER
Letter by Danielle Newman MD at      Author: Danielle Newman MD Service: -- Author Type: --    Filed:  Encounter Date: 2/19/2021 Status: (Other)         [facility] Marrero  [address]  [facility] [address]             February 19, 2021         Dear Ms. Marrero,    Below are the results from your recent visit:    Resulted Orders   Morphology,Smear Review (MORP)   Result Value Ref Range    Pathology, Smear Review See Separate Pathology Report (!) (none)    WBC 14.1 (H) 4.0 - 11.0 thou/uL    RBC 3.57 (L) 3.80 - 5.40 mill/uL    Hemoglobin 11.2 (L) 12.0 - 16.0 g/dL    Hematocrit 34.8 (L) 35.0 - 47.0 %    MCV 98 80 - 100 fL    MCH 31.4 27.0 - 34.0 pg    MCHC 32.2 32.0 - 36.0 g/dL    RDW 13.2 11.0 - 14.5 %    Platelets 240 140 - 440 thou/uL    MPV 11.8 8.5 - 12.5 fL   Manual Differential   Result Value Ref Range    Total Neutrophils % 70 50 - 70 %    Lymphocytes % 13 (L) 20 - 40 %    Monocytes % 18 (H) 2 - 10 %    Eosinophils %  1 0 - 6 %    Basophils % 0 0 - 2 %    Immature Granulocyte % - Manual 0 <=0 %    Total Neutrophils Absolute 9.8 (H) 2.0 - 7.7 thou/ul    Lymphocytes Absolute 1.8 0.8 - 4.4 thou/uL    Monocytes Absolute 2.5 (H) 0.0 - 0.9 thou/uL    Eosinophils Absolute 0.1 0.0 - 0.4 thou/uL    Basophils Absolute 0.0 0.0 - 0.2 thou/uL    Immature Granulocyte Absolute - Manual 0.0 <=0.0 thou/uL    Platelet Estimate Normal Normal    Narrative    Red cell morphology consistent with observed indices     Peripheral Blood Smear, Path Review   Result Value Ref Range    Case Report       Peripheral Blood Morphology Report                Case: [id number]                                   Authorizing Provider:  Paty,         Collected:           02/16/2021 1125                                     MD Danielle                                                                 Ordering Location:     [facility]   Received:            02/17/2021 0800                                      [facility]                                                                  Pathologist:           Franky Zuñiga MD                                                        Specimen:    Peripheral Blood                                                                           Final Diagnosis       PERIPHERAL BLOOD:      -  MIXED LEUKOCYTOSIS      -  NORMOCHROMIC-NORMOCYTIC ANEMIA    Comment       The clinical history has been reviewed. The causes of reactive neutrophilia are numerous and range from infection to metabolic defects. Bacterial infections are the predominant cause of neutrophilia; other causes include therapeutic or endogenous drugs/hormones, acute stress, acute tissue necrosis and other infectious/inflammatory processes, including collagen vascular disorders and autoimmune disease. Absolute neutrophilia is seen in a variety of hematopoietic neoplasms, especially myeloproliferative disorders. Recommend clinical correlation; consider repeat CBC after resolution of any possible infection. If a diagnosis of CML is suspected, evaluation of the peripheral blood for the presence of the Wake chromosome and/or the BCR/ABL fusion gene is suggested.     Reactive monocytosis can be seen in chronic infections, collagen vascular diseases, immune-mediated gastrointestinal disorders, sarcoidosis, hemolytic anemia, and recovery phase of agranulocytosis, among  others.    Normocytic anemia can be caused by multiple etiologies including intrinsic bone marrow disease, renal disease, hepatic disease, endocrine disease, anemia of chronic disease, post-hemorrhagic anemia, and bone marrow infiltration by tumors. Recommend clinical correlation and follow-up.    Clinical Information D72.9     Peripheral Smear       Red blood cells are decreased in number but overall normochromic and normocytic. Anisopoikilocytosis, polychromasia, and rouleaux formation are not prominent.    The white blood cell count and  "differential appear as reported on the CBC. Leukocytes are increased in number and demonstrate an absolute neutrophilia/monocytosis. No blasts or dysplastic changes are identified.    Platelets are normal in number and appearance.    Charges CPT: 37109  ICD-10: D64.9        Your blood test looks okay - on your previous test, they mentioned some \"funny looking cells\", so I wanted them to look closer under the microscope.  It all checks out okay!!    Please call with questions or contact us using "[employer]    Sincerely,        Electronically signed by Danielle Newman MD       "

## 2021-07-03 NOTE — ADDENDUM NOTE
Addendum Note by Deann Gardner CMA at 10/21/2020  7:42 AM     Author: Deann Gardner CMA Service: -- Author Type: Medical Assistant    Filed: 10/21/2020  7:42 AM Encounter Date: 10/20/2020 Status: Signed    : Deann Gardner CMA (Medical Assistant)    Addended by: DEANN GARDNER on: 10/21/2020 07:42 AM        Modules accepted: Orders

## 2021-07-03 NOTE — ADDENDUM NOTE
Addendum Note by Rajiv Hickey MD at 10/21/2020  8:07 AM     Author: Rajiv Hickey MD Service: -- Author Type: Physician    Filed: 10/21/2020  8:07 AM Encounter Date: 10/20/2020 Status: Signed    : Rajiv Hickey MD (Physician)    Addended by: RAJIV HICKEY on: 10/21/2020 08:07 AM        Modules accepted: Orders

## 2021-07-12 ENCOUNTER — TRANSFERRED RECORDS (OUTPATIENT)
Dept: HEALTH INFORMATION MANAGEMENT | Facility: CLINIC | Age: 86
End: 2021-07-12

## 2021-07-13 ENCOUNTER — MEDICAL CORRESPONDENCE (OUTPATIENT)
Dept: HEALTH INFORMATION MANAGEMENT | Facility: CLINIC | Age: 86
End: 2021-07-13

## 2021-07-13 ENCOUNTER — TRANSFERRED RECORDS (OUTPATIENT)
Dept: HEALTH INFORMATION MANAGEMENT | Facility: CLINIC | Age: 86
End: 2021-07-13

## 2021-07-13 ENCOUNTER — TELEPHONE (OUTPATIENT)
Dept: FAMILY MEDICINE | Facility: CLINIC | Age: 86
End: 2021-07-13

## 2021-07-13 NOTE — TELEPHONE ENCOUNTER
Pt's daughter stated that yesterday the pt was not feeling good yesterday, her daughter took her down to Urgent Care. The pt was put on abx due to her UTI that she keeps having, she is wondering what is she needing in order for the Assisted Living Center to be passing out her abx and how she goes about having a provider going to the Assisted Living Center ([facility] in [place], [place]) to see her instead of having to bring her to Urgent Care.

## 2021-07-13 NOTE — TELEPHONE ENCOUNTER
Reason for Call:  Other Order to distribute medications at Assisted Living    Detailed comments: Patient's daughter, Cuca called to request a call from provider's team to discuss orders for assisted living facility to distribute patient's medication. Patient was seen at urgent care yesterday for a UTI and is on antibiotics 3 times per day. Please call Cuca.    Phone Number Patient can be reached at: Other phone number:  [phone]    Best Time: any    Can we leave a detailed message on this number? YES    Call taken on 7/13/2021 at 11:44 AM by Demetris Love

## 2021-07-13 NOTE — TELEPHONE ENCOUNTER
Patient's daughter was contacted.  She will get more information as to what orders they need from me and get back to me.    I gave them the fax number [phone] the right [place] fax number

## 2021-07-14 ENCOUNTER — TRANSFERRED RECORDS (OUTPATIENT)
Dept: HEALTH INFORMATION MANAGEMENT | Facility: CLINIC | Age: 86
End: 2021-07-14

## 2021-07-15 PROBLEM — Z66 DNR NO CODE (DO NOT RESUSCITATE): Status: ACTIVE | Noted: 2021-07-15

## 2021-07-15 RX ORDER — CEPHALEXIN 500 MG/1
500 CAPSULE ORAL
COMMUNITY
Start: 2021-07-12 | End: 2021-07-15

## 2021-07-15 RX ORDER — CEPHALEXIN 500 MG/1
500 CAPSULE ORAL 3 TIMES DAILY
Qty: 21 CAPSULE | Refills: 0 | COMMUNITY
Start: 2021-07-12 | End: 2021-07-23

## 2021-07-15 NOTE — TELEPHONE ENCOUNTER
Patient's assisted living, [facility] called and would like provider to sign the order and attach patient's med list and fax it back so that they may start administering patient's medications. Patient has missed antibiotic dosages and she needs to take them for her UTI. Please sign orders and fax back today.   Orders were faxed on Tuesday, a copy was placed in provider's mailbox.

## 2021-07-22 DIAGNOSIS — I10 ESSENTIAL HYPERTENSION: Primary | ICD-10-CM

## 2021-07-22 DIAGNOSIS — I10 HTN (HYPERTENSION): ICD-10-CM

## 2021-07-22 DIAGNOSIS — I10 BENIGN ESSENTIAL HYPERTENSION: ICD-10-CM

## 2021-07-23 ENCOUNTER — DOCUMENTATION ONLY (OUTPATIENT)
Dept: OTHER | Facility: CLINIC | Age: 86
End: 2021-07-23

## 2021-07-23 ENCOUNTER — ASSISTED LIVING VISIT (OUTPATIENT)
Dept: GERIATRICS | Facility: CLINIC | Age: 86
End: 2021-07-23
Payer: MEDICARE

## 2021-07-23 VITALS
OXYGEN SATURATION: 96 % | HEART RATE: 85 BPM | SYSTOLIC BLOOD PRESSURE: 122 MMHG | RESPIRATION RATE: 16 BRPM | WEIGHT: 109 LBS | DIASTOLIC BLOOD PRESSURE: 67 MMHG | BODY MASS INDEX: 23.2 KG/M2

## 2021-07-23 DIAGNOSIS — I10 ESSENTIAL HYPERTENSION: Primary | ICD-10-CM

## 2021-07-23 DIAGNOSIS — Z87.440 PERSONAL HISTORY OF URINARY TRACT INFECTION: ICD-10-CM

## 2021-07-23 DIAGNOSIS — R91.1 NODULE OF LOWER LOBE OF RIGHT LUNG: ICD-10-CM

## 2021-07-23 DIAGNOSIS — Z71.89 ACP (ADVANCE CARE PLANNING): ICD-10-CM

## 2021-07-23 DIAGNOSIS — R41.3 MEMORY LOSS: ICD-10-CM

## 2021-07-23 PROBLEM — A41.9 SEPSIS (H): Status: ACTIVE | Noted: 2021-02-13

## 2021-07-23 PROBLEM — R41.89 COGNITIVE IMPAIRMENT: Status: ACTIVE | Noted: 2021-02-10

## 2021-07-23 PROBLEM — E78.5 HYPERLIPIDEMIA: Status: ACTIVE | Noted: 2021-02-10

## 2021-07-23 PROBLEM — N39.0 UTI (URINARY TRACT INFECTION): Status: ACTIVE | Noted: 2021-02-13

## 2021-07-23 NOTE — PROGRESS NOTES
[facility]  PRIMARY CARE PROVIDER AND CLINIC:  Bipin Purdy, APRN CNP, [address] / [place] [address]  Chief Complaint   Patient presents with     [facility]     [facility] Medical Record Number:  [id number]  Place of Service where encounter took place:  [facility] ([facility]) [[id number]]    Bhakti Marrero  is a 89 year old  (5/7/1930), living in above facility since March 2021 and now choosing to change PCPs to [facility]. .  Admitted to this facility for  rehab, medical management and nursing care.    HPI:    HPI information obtained from: facility chart records, facility staff, patient report, [facility] Epic chart review and family/first contact son report.   Brief Summary of Hospital Course:   HTN.  Cont. On hydrochlorothiazide.  BP stable, HR stable today. Per son, has h/o elevated HR at times.  Cont. On asa.  No reports of dizziness. Fluid intake adequate. 7/12/21 bmp stable. Cr. 0.90  7/12/21 ECG, no arrhythmias.     UTI: 7/12/21 seen in UC. Had increased fatigue, weakness.  WBC 17.6, troponin neg. UA +.  tx with keflex.  antbx ended last week.  Reports feeling better, more energy.  No dysuria or fever. Per staff, has been coming out of apt more with spouse.     Memory loss.  Cont. On namenda.  MMSE 19/30.  Mood gen. Stable.  No reports of insomnia or increased anxiety.  Wt., po intake stable.     RLL nodule.  Hosp. Stay 2/21 for L chest pain.  W/u neg for acute changes. Was tx with anbtx for slight pneumonia, UTI.  C.p. resolved.  Per son, has RLL nodule found on imaging.  No cough or resp. Distress.  No reports of c.p. since 2/21.  Does have h/o breast Ca, bilat mastectomy.      Updates on Status Since Skilled nursing Admission: mood stable.  Po intake stable.  Some daytime sleepiness.     CODE STATUS/ADVANCE DIRECTIVES DISCUSSION:   DNR / DNI  Patient's living condition: lives in an assisted living facility  ALLERGIES: Patient has no known  allergies.  PAST MEDICAL HISTORY:  has a past medical history of Bilateral malignant neoplasm of breast in female (H) (2/16/2021), Chest wall pain (2/21/2021), Hypertension, Low bone mass (11/9/2016), and Right lower lobe pulmonary nodule (2/11/2021).  PAST SURGICAL HISTORY:   has a past surgical history that includes Mastectomy.  FAMILY HISTORY: family history includes Cerebrovascular Disease in her father.  SOCIAL HISTORY:   reports that she has never smoked. She has never used smokeless tobacco. She reports current alcohol use of about 6.0 - 7.0 standard drinks of alcohol per week. She reports that she does not use drugs.    Post Discharge Medication Reconciliation Status: discharge medications reconciled, continue medications without change    Current Outpatient Medications   Medication Sig Dispense Refill     alendronate (FOSAMAX) 70 MG tablet [ALENDRONATE (FOSAMAX) 70 MG TABLET] TAKE 1 TABLET BY MOUTH ONCE A WEEK IN THE MORNING ON AN EMPTY STOMACH WITH  A  FULL  GLASS  OF  WATER,  30  MINUTES  BEFORE  FOOD 12 tablet 3     aspirin (ASA) 81 MG EC tablet Take 81 mg by mouth every other day        atorvastatin (LIPITOR) 20 MG tablet [ATORVASTATIN (LIPITOR) 20 MG TABLET] Take 1 tablet (20 mg total) by mouth daily. 90 tablet 3     cholecalciferol, vitamin D3, 50 mcg (2,000 unit) Tab [CHOLECALCIFEROL, VITAMIN D3, 50 MCG (2,000 UNIT) TAB] 1 p.o. daily  0     hydroCHLOROthiazide (HYDRODIURIL) 25 MG tablet [HYDROCHLOROTHIAZIDE (HYDRODIURIL) 25 MG TABLET] TAKE 1 TABLET BY MOUTH EVERY DAY 90 tablet 0     memantine (NAMENDA) 5 MG tablet [MEMANTINE (NAMENDA) 5 MG TABLET] Take 1 tablet (5 mg total) by mouth daily. 90 tablet 3         ROS:  CONSTITUTIONAL:  forgetfulness, EYES:  neg, ENT:  dental problem, CV:  neg, RESPIRATORY: neg, :  h/o UTI, GI:  neg, NEURO:  neg, PSYCH: neg and MUSCULOSKELETAL: neg    Vitals:  /67   Pulse 85   Resp 16   Wt 49.4 kg (109 lb)   SpO2 96%   BMI 23.20 kg/m    Exam:  GENERAL  APPEARANCE:  Alert, in no distress, appears healthy, cooperative  ENT:  Mouth and posterior oropharynx normal, moist mucous membranes, normal hearing acuity, adequate dentition  EYES:  EOM, conjunctivae, lids, pupils and irises normal, PERRL, no drainage  NECK:  No adenopathy,masses or thyromegaly, no carotid bruit  RESP:  respiratory effort and palpation of chest normal, lungs clear to auscultation , no respiratory distress  CV:  Palpation and auscultation of heart done , regular rate and rhythm, no murmur, rub, or gallop, no edema  ABDOMEN:  normal bowel sounds, soft, nontender, no hepatosplenomegaly or other masses, no guarding or rebound, no bruits  M/S:   Gait and station normal  muscle strength 5/5 all 4 ext., normal tone  NEURO:   Cranial nerves 2-12 are normal tested and grossly at patient's baseline, speech clear  PSYCH:  memory impaired , affect and mood normal, no apparent anxiety    Lab/Diagnostic data:  Recent labs in [facility] reviewed by me today.     ASSESSMENT/PLAN:  Essential hypertension  Currently stable  1. Cont. Hydrochlorothiazide  2. Follow BPS, HRs  3. Monitor for dizziness  4. Cbc, bmp in next 1-3 mos    Personal history of urinary tract infection  S/p UTI 7/12/21. Completed keflex course  1. Encourage fluids  2. Monitor for fever, lethragy  3. Monitor for increased urinary freq., confusion  4. For above s/s check UA    Memory loss  STML. Mood gen. Stable. Increased time out of apt per staff  1. Cont. namenda  2. Monitor for reports of insomnia, increased anxiety  3. Staff to start med administration  4. Monitor for changes in mood, behavior    Nodule of lower lobe of right lung  Per imaging during last hosp. Stay.    1. Spoke with son, resident.  Do not want further w/u on lung nodule at this time.  Will monitor for changes in resp. Status or chest pain.  Would consider getting CXR for changes in resp. Status  2. Follow O2 sats, temps  3. Monitor for reports of chest pain    ACP (advance care  planning)  DNR, DNI. Comfort focus. POLST in chart       Electronically signed by:  GIOVANY Mills CNP

## 2021-07-23 NOTE — LETTER
7/23/2021        RE: Bhakti Marrero  [facility]  [address]  [place] [address]        [facility]  PRIMARY CARE PROVIDER AND CLINIC:  Bipin Purdy, GIOVANY CNP, [address] / [place] [address]  Chief Complaint   Patient presents with     [facility]     [facility] Record Number:  [id number]  Place of Service where encounter took place:  [facility] ([facility]) [[id number]]    Bhakti Marrero  is a 89 year old  (5/7/1930), living in above facility since March 2021 and now choosing to change PCPs to [facility]. .  Admitted to this facility for  rehab, medical management and nursing care.    HPI:    HPI information obtained from: facility chart records, facility staff, patient report, [facility] Epic chart review and family/first contact son report.   Brief Summary of Hospital Course:   HTN.  Cont. On hydrochlorothiazide.  BP stable, HR stable today. Per son, has h/o elevated HR at times.  Cont. On asa.  No reports of dizziness. Fluid intake adequate. 7/12/21 bmp stable. Cr. 0.90  7/12/21 ECG, no arrhythmias.     UTI: 7/12/21 seen in UC. Had increased fatigue, weakness.  WBC 17.6, troponin neg. UA +.  tx with keflex.  antbx ended last week.  Reports feeling better, more energy.  No dysuria or fever. Per staff, has been coming out of apt more with spouse.     Memory loss.  Cont. On namenda.  MMSE 19/30.  Mood gen. Stable.  No reports of insomnia or increased anxiety.  Wt., po intake stable.     RLL nodule.  Hosp. Stay 2/21 for L chest pain.  W/u neg for acute changes. Was tx with anbtx for slight pneumonia, UTI.  C.p. resolved.  Per son, has RLL nodule found on imaging.  No cough or resp. Distress.  No reports of c.p. since 2/21.  Does have h/o breast Ca, bilat mastectomy.      Updates on Status Since Skilled nursing Admission: mood stable.  Po intake stable.  Some daytime sleepiness.     CODE STATUS/ADVANCE DIRECTIVES DISCUSSION:   DNR /  DNI  Patient's living condition: lives in an assisted living facility  ALLERGIES: Patient has no known allergies.  PAST MEDICAL HISTORY:  has a past medical history of Bilateral malignant neoplasm of breast in female (H) (2/16/2021), Chest wall pain (2/21/2021), Hypertension, Low bone mass (11/9/2016), and Right lower lobe pulmonary nodule (2/11/2021).  PAST SURGICAL HISTORY:   has a past surgical history that includes Mastectomy.  FAMILY HISTORY: family history includes Cerebrovascular Disease in her father.  SOCIAL HISTORY:   reports that she has never smoked. She has never used smokeless tobacco. She reports current alcohol use of about 6.0 - 7.0 standard drinks of alcohol per week. She reports that she does not use drugs.    Post Discharge Medication Reconciliation Status: discharge medications reconciled, continue medications without change    Current Outpatient Medications   Medication Sig Dispense Refill     alendronate (FOSAMAX) 70 MG tablet [ALENDRONATE (FOSAMAX) 70 MG TABLET] TAKE 1 TABLET BY MOUTH ONCE A WEEK IN THE MORNING ON AN EMPTY STOMACH WITH  A  FULL  GLASS  OF  WATER,  30  MINUTES  BEFORE  FOOD 12 tablet 3     aspirin (ASA) 81 MG EC tablet Take 81 mg by mouth every other day        atorvastatin (LIPITOR) 20 MG tablet [ATORVASTATIN (LIPITOR) 20 MG TABLET] Take 1 tablet (20 mg total) by mouth daily. 90 tablet 3     cholecalciferol, vitamin D3, 50 mcg (2,000 unit) Tab [CHOLECALCIFEROL, VITAMIN D3, 50 MCG (2,000 UNIT) TAB] 1 p.o. daily  0     hydroCHLOROthiazide (HYDRODIURIL) 25 MG tablet [HYDROCHLOROTHIAZIDE (HYDRODIURIL) 25 MG TABLET] TAKE 1 TABLET BY MOUTH EVERY DAY 90 tablet 0     memantine (NAMENDA) 5 MG tablet [MEMANTINE (NAMENDA) 5 MG TABLET] Take 1 tablet (5 mg total) by mouth daily. 90 tablet 3         ROS:  CONSTITUTIONAL:  forgetfulness, EYES:  neg, ENT:  dental problem, CV:  neg, RESPIRATORY: neg, :  h/o UTI, GI:  neg, NEURO:  neg, PSYCH: neg and MUSCULOSKELETAL: neg    Vitals:  BP  122/67   Pulse 85   Resp 16   Wt 49.4 kg (109 lb)   SpO2 96%   BMI 23.20 kg/m    Exam:  GENERAL APPEARANCE:  Alert, in no distress, appears healthy, cooperative  ENT:  Mouth and posterior oropharynx normal, moist mucous membranes, normal hearing acuity, adequate dentition  EYES:  EOM, conjunctivae, lids, pupils and irises normal, PERRL, no drainage  NECK:  No adenopathy,masses or thyromegaly, no carotid bruit  RESP:  respiratory effort and palpation of chest normal, lungs clear to auscultation , no respiratory distress  CV:  Palpation and auscultation of heart done , regular rate and rhythm, no murmur, rub, or gallop, no edema  ABDOMEN:  normal bowel sounds, soft, nontender, no hepatosplenomegaly or other masses, no guarding or rebound, no bruits  M/S:   Gait and station normal  muscle strength 5/5 all 4 ext., normal tone  NEURO:   Cranial nerves 2-12 are normal tested and grossly at patient's baseline, speech clear  PSYCH:  memory impaired , affect and mood normal, no apparent anxiety    Lab/Diagnostic data:  Recent labs in [facility] reviewed by me today.     ASSESSMENT/PLAN:  Essential hypertension  Currently stable  1. Cont. Hydrochlorothiazide  2. Follow BPS, HRs  3. Monitor for dizziness  4. Cbc, bmp in next 1-3 mos    Personal history of urinary tract infection  S/p UTI 7/12/21. Completed keflex course  1. Encourage fluids  2. Monitor for fever, lethragy  3. Monitor for increased urinary freq., confusion  4. For above s/s check UA    Memory loss  STML. Mood gen. Stable. Increased time out of apt per staff  1. Cont. namenda  2. Monitor for reports of insomnia, increased anxiety  3. Staff to start med administration  4. Monitor for changes in mood, behavior    Nodule of lower lobe of right lung  Per imaging during last hosp. Stay.    1. Spoke with son, resident.  Do not want further w/u on lung nodule at this time.  Will monitor for changes in resp. Status or chest pain.  Would consider getting CXR for changes  in resp. Status  2. Follow O2 sats, temps  3. Monitor for reports of chest pain    ACP (advance care planning)  DNR, DNI. Comfort focus. POLST in chart       Electronically signed by:  GIOVANY Mills CNP                           Sincerely,        GIOVANY Mills CNP

## 2021-07-26 RX ORDER — HYDROCHLOROTHIAZIDE 25 MG/1
TABLET ORAL
Qty: 90 TABLET | Refills: 1 | Status: SHIPPED | OUTPATIENT
Start: 2021-07-26 | End: 2021-08-02

## 2021-07-27 DIAGNOSIS — I10 BENIGN ESSENTIAL HYPERTENSION: ICD-10-CM

## 2021-07-27 RX ORDER — MUPIROCIN 20 MG/G
OINTMENT TOPICAL
COMMUNITY
End: 2022-08-04

## 2021-07-27 RX ORDER — LIDOCAINE 4 G/G
PATCH TOPICAL
COMMUNITY
Start: 2021-02-13 | End: 2023-01-01

## 2021-07-27 RX ORDER — LEVOFLOXACIN 250 MG/1
TABLET, FILM COATED ORAL
COMMUNITY
Start: 2021-02-12 | End: 2021-11-08

## 2021-07-31 NOTE — TELEPHONE ENCOUNTER
"Routing refill request to provider for review/approval because:  Labs from 7/12/21: NA low and requires PCP approval to send Rx        ___________________________________________________________    Copy of Last Rx:        Last office visit with provider:  7/23/21   ___________________________________________________________    Requested Prescriptions   Pending Prescriptions Disp Refills     hydrochlorothiazide (HYDRODIURIL) 25 MG tablet 90 tablet 1     Sig: [HYDROCHLOROTHIAZIDE (HYDRODIURIL) 25 MG TABLET] TAKE 1 TABLET BY MOUTH EVERY DAY       Diuretics (Including Combos) Protocol Failed - 7/27/2021 10:40 AM        Failed - Normal serum creatinine on file in past 12 months     Recent Labs   Lab Test 10/03/19  0955   CR 0.80              Failed - Normal serum potassium on file in past 12 months     Recent Labs   Lab Test 10/03/19  0955   POTASSIUM 4.4                    Failed - Normal serum sodium on file in past 12 months     Recent Labs   Lab Test 10/03/19  0955                 Passed - Blood pressure under 140/90 in past 12 months     BP Readings from Last 3 Encounters:   07/23/21 122/67   03/11/21 105/60   02/16/21 137/82                 Passed - Recent (12 mo) or future (30 days) visit within the authorizing provider's specialty     Patient has had an office visit with the authorizing provider or a provider within the authorizing providers department within the previous 12 mos or has a future within next 30 days. See \"Patient Info\" tab in inbasket, or \"Choose Columns\" in Meds & Orders section of the refill encounter.              Passed - Medication is active on med list        Passed - Patient is age 18 or older        Passed - No active pregancy on record        Passed - No positive pregnancy test in past 12 months         Signed Prescriptions Disp Refills    levofloxacin (LEVAQUIN) 250 MG tablet         There is no refill protocol information for this order       Lidocaine (LIDOCARE) 4 % Patch       " Sig: Apply 1 patch to intact skin of most painful area for upto 12 hrs within 24-hr period.       There is no refill protocol information for this order       mupirocin (BACTROBAN) 2 % external ointment         There is no refill protocol information for this order          Linda Smallwood RN 07/31/21 12:28 AM

## 2021-08-02 RX ORDER — HYDROCHLOROTHIAZIDE 25 MG/1
TABLET ORAL
Qty: 30 TABLET | Refills: 11 | Status: ON HOLD | OUTPATIENT
Start: 2021-08-02 | End: 2023-01-01

## 2021-10-11 ENCOUNTER — HEALTH MAINTENANCE LETTER (OUTPATIENT)
Age: 86
End: 2021-10-11

## 2021-10-29 ENCOUNTER — ASSISTED LIVING VISIT (OUTPATIENT)
Dept: GERIATRICS | Facility: CLINIC | Age: 86
End: 2021-10-29
Payer: MEDICARE

## 2021-10-29 DIAGNOSIS — C50.911 BILATERAL MALIGNANT NEOPLASM OF BREAST IN FEMALE, ESTROGEN RECEPTOR POSITIVE, UNSPECIFIED SITE OF BREAST (H): ICD-10-CM

## 2021-10-29 DIAGNOSIS — Z17.0 BILATERAL MALIGNANT NEOPLASM OF BREAST IN FEMALE, ESTROGEN RECEPTOR POSITIVE, UNSPECIFIED SITE OF BREAST (H): ICD-10-CM

## 2021-10-29 DIAGNOSIS — I51.89 DIASTOLIC DYSFUNCTION: ICD-10-CM

## 2021-10-29 DIAGNOSIS — M81.0 AGE-RELATED OSTEOPOROSIS WITHOUT CURRENT PATHOLOGICAL FRACTURE: ICD-10-CM

## 2021-10-29 DIAGNOSIS — R41.89 COGNITIVE IMPAIRMENT: ICD-10-CM

## 2021-10-29 DIAGNOSIS — E78.5 HYPERLIPIDEMIA, UNSPECIFIED HYPERLIPIDEMIA TYPE: ICD-10-CM

## 2021-10-29 DIAGNOSIS — C50.912 BILATERAL MALIGNANT NEOPLASM OF BREAST IN FEMALE, ESTROGEN RECEPTOR POSITIVE, UNSPECIFIED SITE OF BREAST (H): ICD-10-CM

## 2021-10-29 DIAGNOSIS — R91.1 NODULE OF LOWER LOBE OF RIGHT LUNG: ICD-10-CM

## 2021-10-29 DIAGNOSIS — I10 ESSENTIAL HYPERTENSION: Primary | ICD-10-CM

## 2021-10-29 ASSESSMENT — MIFFLIN-ST. JEOR: SCORE: 822.31

## 2021-10-29 NOTE — LETTER
10/29/2021        RE: Bhakti Marrero  [facility]  [address]  [facility] [address]        Bhakti Marrero is a 89 year old female seen October 29, 2021 at [facility] where she has resided for 7 months (admit 3/2021) recently turned over to [facility] and seen for initial visit.   Pt is seen in her apartment with her  Tree and daughter Cuca present.   Pt reports she is feeling well, denies any current pain or other symptoms     By chart review, pt has had a gradual cognitive decline over past few years, started on memantine in 2019 for mini-cog score 4     Pt and her  were able to live in their home with family support until she had a [facility] stay in February 2021 for pleuritic chest wall pain with tachycardia.  She was treated with abx for possible CAP.  ECHO showed diastolic dysfunction.  UC + for E coli and Pseudomonas   She improved and was discharged home, but family looking for a higher level of care, so transitioned to AL after that.    She had a DEXA scan in 2015 and was started on alendronate at that time.  No fractures.   She was seen in the ED in July 2021 for UTI; not able to see UC results.     Past Medical History:   Diagnosis Date     Bilateral malignant neoplasm of breast in female (H) 2/16/2021     Chest wall pain 2/21/2021     Hypertension     Created by [employer]version [employer] updated for latest IMO load      Low bone mass 11/9/2016    Started on alendronate on 11/2/15      Right lower lobe pulmonary nodule 2/11/2021    CT scan at [facility] on 2/2021. Recommendation to recheck in 3 months.  I spoke with daughter,Cuca, on  5/3/2021.  Patient is now in assisted living, memory care.  Family does  not wish to follow-up on this small lung nodule based on patient's age and  declining health.      Past Surgical History:   Procedure Laterality Date     MASTECTOMY       SH: Lives with her  Tree, [place] apartment since March 2021  They have been  70  "years and previously lived in their home in [place].   They have a son Shorty and daughter Cuca.   Non smoker    ROS:   Wt Readings from Last 5 Encounters:   10/29/21 53.3 kg (117 lb 9.6 oz)   07/23/21 49.4 kg (109 lb)   02/16/21 49.7 kg (109 lb 8 oz)   10/03/19 51.3 kg (113 lb)   09/17/18 51.7 kg (114 lb)      EXAM: NAD  /74   Pulse 99   Temp 97.8  F (36.6  C)   Resp 18   Ht 1.448 m (4' 9\")   Wt 53.3 kg (117 lb 9.6 oz)   SpO2 95%   BMI 25.45 kg/m     Neck supple without adenopathy  Lungs clear bilaterally with good air movement   Heart RRR s1s2   Abd soft, NT, no distention or guarding, +BS  Ext without edema  Neuro: normal speech, remains ambulatory, limited history  Psych: affect pleasant, smiling      7/12/2021:       WHITE BLOOD COUNT         4.6 - 10.2 thou/cu mm 17.6 High     RED BLOOD COUNT           4.04 - 6.13 mil/cu mm 3.50 Low     HEMOGLOBIN                12.2 - 18.1 g/dL 11.5 Low     HEMATOCRIT                37.7 - 53.7 % 34.0 Low     MCV                       80 - 97 fL 97    MCH                       27.0 - 31.2 pg 32.9 High     MCHC                      31.8 - 35.4 g/dL 33.8    RDW                       11.6 - 14.8 % 14.2    PLATELET COUNT            142 - 424 thou/cu mm 165    7/12/2021:       SODIUM 137 - 145 mmol/L 135 Low     POTASSIUM 3.5 - 5.1 mmol/L 4.0    CHLORIDE 98 - 107 mmol/L 99    CO2,TOTAL 22 - 30 mmol/L 26    ANION GAP 8 - 12 10    GLUCOSE,RANDOM 74 - 106 mg/dL 137 High     CALCIUM 8.4 - 10.2 mg/dL 9.6    BUN 7 - 17 mg/dL 12    CREATININE 0.52 - 1.04 mg/dL 0.90    BUN/CREAT RATIO 10 - 20 13    GFR if African American >60 ml/min/1.73m2 >60    GFR if not African American >60 ml/min/1.73m2 59 Low       ECHO 2/11/2021    Final Conclusion    1. Normal left ventricular systolic function. Calculated left ventricular ejection fraction (modified Fontaine technique) is 66 %.    2. No regional wall motion abnormalities.    3. Ventricular septal thickening (1.5 cm).    4. " Grade 1 left ventricular diastolic dysfunction consistent with abnormal myocardial   relaxation and normal left ventricular filling pressure.    5. Normal right ventricular chamber size. Normal right ventricular systolic function.    6. No significant valvular heart disease.      Chest CT 2/11/2021    IMPRESSION:   1.  No pulmonary emboli. Normal thoracic aorta.   2.  Borderline bronchial wall thickening with a few foci of endobronchial secretions could indicate some degree of nonspecific bronchial inflammation.   3.  A small focus of consolidation in the posterolateral aspect basilar segments right lower lobe. Finding is nonspecific but could be a small focus of pneumonitis either resolving or acute.   4.  A 6 mm irregularly marginated right lower lobe nodule may be inflammatory. Recommend followup CT chest in 3 months to ensure resolution.      IMP/PLAN:   (I10) Essential hypertension  (I51.89) Diastolic dysfunction  Comment:   BP Readings from Last 3 Encounters:   10/29/21 116/74   07/23/21 122/67   03/11/21 105/60      Plan: hydrochlorothiazide 25 mg/day; follow bps and BMP    (E78.5) Hyperlipidemia, unspecified hyperlipidemia type  Comment: no h/o CV event   Plan: remains on aspirin 81 mg/day and atorvastatin 20 mg/day for secondary prevention.  Could consider discontinuation at this point, reviewed with daughter.     (M81.0) Age-related osteoporosis without current pathological fracture  Comment: has been on alendronate 5 years   Plan: discontinue alendronate now for drug holiday.   Continue vit D 50 mcg/day and dietary calcium     (R41.89) Cognitive impairment  Comment: gradual decline  Plan: AL support for med admin, meals, activity.     (C50.911,  Z17.0,  C50.912) Bilateral malignant neoplasm of breast in female, estrogen receptor positive, unspecified site of breast (H)  Comment: was on estrogen blocker until past year   Plan: follow up with Oncology prn     (R91.1) Nodule of lower lobe of right  lung  Comment: family and pt elected not to follow up or repeat imaging      Arline Hicks MD         Sincerely,        Arline Hicks MD

## 2021-11-01 ENCOUNTER — LAB REQUISITION (OUTPATIENT)
Dept: LAB | Facility: CLINIC | Age: 86
End: 2021-11-01
Payer: MEDICARE

## 2021-11-01 VITALS
WEIGHT: 117.6 LBS | RESPIRATION RATE: 18 BRPM | HEART RATE: 99 BPM | HEIGHT: 57 IN | BODY MASS INDEX: 25.37 KG/M2 | SYSTOLIC BLOOD PRESSURE: 116 MMHG | OXYGEN SATURATION: 95 % | TEMPERATURE: 97.8 F | DIASTOLIC BLOOD PRESSURE: 74 MMHG

## 2021-11-01 DIAGNOSIS — E78.5 HYPERLIPIDEMIA, UNSPECIFIED: ICD-10-CM

## 2021-11-01 DIAGNOSIS — I10 ESSENTIAL (PRIMARY) HYPERTENSION: ICD-10-CM

## 2021-11-02 ENCOUNTER — TRANSFERRED RECORDS (OUTPATIENT)
Dept: HEALTH INFORMATION MANAGEMENT | Facility: CLINIC | Age: 86
End: 2021-11-02

## 2021-11-02 LAB
ALT SERPL W P-5'-P-CCNC: <9 U/L (ref 0–45)
ANION GAP SERPL CALCULATED.3IONS-SCNC: 10 MMOL/L (ref 5–18)
BASOPHILS # BLD MANUAL: 0 10E3/UL (ref 0–0.2)
BASOPHILS NFR BLD MANUAL: 0 %
BUN SERPL-MCNC: 11 MG/DL (ref 8–28)
CALCIUM SERPL-MCNC: 10.1 MG/DL (ref 8.5–10.5)
CHLORIDE BLD-SCNC: 95 MMOL/L (ref 98–107)
CHOLEST SERPL-MCNC: 137 MG/DL
CO2 SERPL-SCNC: 30 MMOL/L (ref 22–31)
CREAT SERPL-MCNC: 0.9 MG/DL (ref 0.6–1.1)
EOSINOPHIL # BLD MANUAL: 0 10E3/UL (ref 0–0.7)
EOSINOPHIL NFR BLD MANUAL: 0 %
ERYTHROCYTE [DISTWIDTH] IN BLOOD BY AUTOMATED COUNT: 13.3 % (ref 10–15)
FASTING STATUS PATIENT QL REPORTED: ABNORMAL
GFR SERPL CREATININE-BSD FRML MDRD: 56 ML/MIN/1.73M2
GLUCOSE BLD-MCNC: 125 MG/DL (ref 70–125)
HCT VFR BLD AUTO: 33 % (ref 35–47)
HDLC SERPL-MCNC: 48 MG/DL
HGB BLD-MCNC: 10.7 G/DL (ref 11.7–15.7)
LDLC SERPL CALC-MCNC: 61 MG/DL
LYMPHOCYTES # BLD MANUAL: 2.2 10E3/UL (ref 0.8–5.3)
LYMPHOCYTES NFR BLD MANUAL: 28 %
MCH RBC QN AUTO: 31.6 PG (ref 26.5–33)
MCHC RBC AUTO-ENTMCNC: 32.4 G/DL (ref 31.5–36.5)
MCV RBC AUTO: 97 FL (ref 78–100)
MONOCYTES # BLD MANUAL: 1.8 10E3/UL (ref 0–1.3)
MONOCYTES NFR BLD MANUAL: 23 %
NEUTROPHILS # BLD MANUAL: 3.8 10E3/UL (ref 1.6–8.3)
NEUTROPHILS NFR BLD MANUAL: 49 %
PLAT MORPH BLD: ABNORMAL
PLATELET # BLD AUTO: 177 10E3/UL (ref 150–450)
POTASSIUM BLD-SCNC: 3.2 MMOL/L (ref 3.5–5)
RBC # BLD AUTO: 3.39 10E6/UL (ref 3.8–5.2)
RBC MORPH BLD: ABNORMAL
SODIUM SERPL-SCNC: 135 MMOL/L (ref 136–145)
TRIGL SERPL-MCNC: 138 MG/DL
WBC # BLD AUTO: 7.8 10E3/UL (ref 4–11)

## 2021-11-02 PROCEDURE — 80061 LIPID PANEL: CPT | Mod: ORL | Performed by: INTERNAL MEDICINE

## 2021-11-02 PROCEDURE — 80048 BASIC METABOLIC PNL TOTAL CA: CPT | Mod: ORL | Performed by: INTERNAL MEDICINE

## 2021-11-02 PROCEDURE — 85027 COMPLETE CBC AUTOMATED: CPT | Mod: ORL | Performed by: INTERNAL MEDICINE

## 2021-11-02 PROCEDURE — 36415 COLL VENOUS BLD VENIPUNCTURE: CPT | Mod: ORL | Performed by: INTERNAL MEDICINE

## 2021-11-02 PROCEDURE — 84460 ALANINE AMINO (ALT) (SGPT): CPT | Mod: ORL | Performed by: INTERNAL MEDICINE

## 2021-11-02 PROCEDURE — P9604 ONE-WAY ALLOW PRORATED TRIP: HCPCS | Mod: ORL | Performed by: INTERNAL MEDICINE

## 2021-11-08 NOTE — PROGRESS NOTES
Bhakti Marrero is a 89 year old female seen October 29, 2021 at [facility] where she has resided for 7 months (admit 3/2021) recently turned over to [facility] and seen for initial visit.   Pt is seen in her apartment with her  Tree and daughter Cuca present.   Pt reports she is feeling well, denies any current pain or other symptoms     By chart review, pt has had a gradual cognitive decline over past few years, started on memantine in 2019 for mini-cog score 4     Pt and her  were able to live in their home with family support until she had a  stay in February 2021 for pleuritic chest wall pain with tachycardia.  She was treated with abx for possible CAP.  ECHO showed diastolic dysfunction.  UC + for E coli and Pseudomonas   She improved and was discharged home, but family looking for a higher level of care, so transitioned to AL after that.    She had a DEXA scan in 2015 and was started on alendronate at that time.  No fractures.   She was seen in the ED in July 2021 for UTI; not able to see UC results.     Past Medical History:   Diagnosis Date     Bilateral malignant neoplasm of breast in female (H) 2/16/2021     Chest wall pain 2/21/2021     Hypertension     Created by Conversion Replacement Utility updated for latest IMO load      Low bone mass 11/9/2016    Started on alendronate on 11/2/15      Right lower lobe pulmonary nodule 2/11/2021    CT scan at  on 2/2021. Recommendation to recheck in 3 months.  I spoke with daughter,Cuca, on  5/3/2021.  Patient is now in assisted living, memory care.  Family does  not wish to follow-up on this small lung nodule based on patient's age and  declining health.      Past Surgical History:   Procedure Laterality Date     MASTECTOMY       SH: Lives with her  BETY Leavitt apartwilian since March 2021  They have been  70 years and previously lived in their home in [place].   They have a son Shorty and daughter  "Cuca.   Non smoker    ROS:   Wt Readings from Last 5 Encounters:   10/29/21 53.3 kg (117 lb 9.6 oz)   07/23/21 49.4 kg (109 lb)   02/16/21 49.7 kg (109 lb 8 oz)   10/03/19 51.3 kg (113 lb)   09/17/18 51.7 kg (114 lb)      EXAM: NAD  /74   Pulse 99   Temp 97.8  F (36.6  C)   Resp 18   Ht 1.448 m (4' 9\")   Wt 53.3 kg (117 lb 9.6 oz)   SpO2 95%   BMI 25.45 kg/m     Neck supple without adenopathy  Lungs clear bilaterally with good air movement   Heart RRR s1s2   Abd soft, NT, no distention or guarding, +BS  Ext without edema  Neuro: normal speech, remains ambulatory, limited history  Psych: affect pleasant, smiling      7/12/2021:       WHITE BLOOD COUNT         4.6 - 10.2 thou/cu mm 17.6 High     RED BLOOD COUNT           4.04 - 6.13 mil/cu mm 3.50 Low     HEMOGLOBIN                12.2 - 18.1 g/dL 11.5 Low     HEMATOCRIT                37.7 - 53.7 % 34.0 Low     MCV                       80 - 97 fL 97    MCH                       27.0 - 31.2 pg 32.9 High     MCHC                      31.8 - 35.4 g/dL 33.8    RDW                       11.6 - 14.8 % 14.2    PLATELET COUNT            142 - 424 thou/cu mm 165    7/12/2021:       SODIUM 137 - 145 mmol/L 135 Low     POTASSIUM 3.5 - 5.1 mmol/L 4.0    CHLORIDE 98 - 107 mmol/L 99    CO2,TOTAL 22 - 30 mmol/L 26    ANION GAP 8 - 12 10    GLUCOSE,RANDOM 74 - 106 mg/dL 137 High     CALCIUM 8.4 - 10.2 mg/dL 9.6    BUN 7 - 17 mg/dL 12    CREATININE 0.52 - 1.04 mg/dL 0.90    BUN/CREAT RATIO 10 - 20 13    GFR if African American >60 ml/min/1.73m2 >60    GFR if not African American >60 ml/min/1.73m2 59 Low       ECHO 2/11/2021    Final Conclusion    1. Normal left ventricular systolic function. Calculated left ventricular ejection fraction (modified Fontaine technique) is 66 %.    2. No regional wall motion abnormalities.    3. Ventricular septal thickening (1.5 cm).    4. Grade 1 left ventricular diastolic dysfunction consistent with abnormal myocardial   relaxation and " normal left ventricular filling pressure.    5. Normal right ventricular chamber size. Normal right ventricular systolic function.    6. No significant valvular heart disease.      Chest CT 2/11/2021    IMPRESSION:   1.  No pulmonary emboli. Normal thoracic aorta.   2.  Borderline bronchial wall thickening with a few foci of endobronchial secretions could indicate some degree of nonspecific bronchial inflammation.   3.  A small focus of consolidation in the posterolateral aspect basilar segments right lower lobe. Finding is nonspecific but could be a small focus of pneumonitis either resolving or acute.   4.  A 6 mm irregularly marginated right lower lobe nodule may be inflammatory. Recommend followup CT chest in 3 months to ensure resolution.      IMP/PLAN:   (I10) Essential hypertension  (I51.89) Diastolic dysfunction  Comment:   BP Readings from Last 3 Encounters:   10/29/21 116/74   07/23/21 122/67   03/11/21 105/60      Plan: hydrochlorothiazide 25 mg/day; follow bps and BMP    (E78.5) Hyperlipidemia, unspecified hyperlipidemia type  Comment: no h/o CV event   Plan: remains on aspirin 81 mg/day and atorvastatin 20 mg/day for secondary prevention.  Could consider discontinuation at this point, reviewed with daughter.     (M81.0) Age-related osteoporosis without current pathological fracture  Comment: has been on alendronate 5 years   Plan: discontinue alendronate now for drug holiday.   Continue vit D 50 mcg/day and dietary calcium     (R41.89) Cognitive impairment  Comment: gradual decline  Plan: AL support for med admin, meals, activity.     (C50.911,  Z17.0,  C50.912) Bilateral malignant neoplasm of breast in female, estrogen receptor positive, unspecified site of breast (H)  Comment: was on estrogen blocker until past year   Plan: follow up with Oncology prn     (R91.1) Nodule of lower lobe of right lung  Comment: family and pt elected not to follow up or repeat imaging      Arline Hicks MD

## 2022-05-22 ENCOUNTER — HEALTH MAINTENANCE LETTER (OUTPATIENT)
Age: 87
End: 2022-05-22

## 2022-08-04 ENCOUNTER — ASSISTED LIVING VISIT (OUTPATIENT)
Dept: GERIATRICS | Facility: CLINIC | Age: 87
End: 2022-08-04
Payer: MEDICARE

## 2022-08-04 VITALS — RESPIRATION RATE: 16 BRPM | TEMPERATURE: 97.9 F | OXYGEN SATURATION: 90 % | HEIGHT: 57 IN | BODY MASS INDEX: 25.45 KG/M2

## 2022-08-04 DIAGNOSIS — R53.83 FATIGUE, UNSPECIFIED TYPE: Primary | ICD-10-CM

## 2022-08-04 NOTE — LETTER
"    8/4/2022        RE: Bhakti Marrero  [facility]  [address] [place] [address]        [facility]  [place] Medical Record Number:  [id number]  Place of Service where encounter took place:  [facility] ([facility] [[id number]]  Chief Complaint   Patient presents with     Fatigue       HPI:    Bhakti Marrero  is a 89 year old (5/7/1930), who is being seen today for an episodic care visit.  HPI information obtained from: facility chart records, facility staff, patient report and [facility] chart review. Today's concern is: fatigue.  + covid test.  Reports minimal fatigue. Per staff, act. Level baseline. Afebrile. Resp. Status stable. O2 sats 90%.      Past Medical and Surgical History reviewed in Epic today.    MEDICATIONS:    Current Outpatient Medications   Medication Sig Dispense Refill     aspirin (ASA) 81 MG EC tablet Take 81 mg by mouth every other day        atorvastatin (LIPITOR) 20 MG tablet [ATORVASTATIN (LIPITOR) 20 MG TABLET] Take 1 tablet (20 mg total) by mouth daily. 90 tablet 3     cholecalciferol, vitamin D3, 50 mcg (2,000 unit) Tab [CHOLECALCIFEROL, VITAMIN D3, 50 MCG (2,000 UNIT) TAB] 1 p.o. daily  0     hydrochlorothiazide (HYDRODIURIL) 25 MG tablet [HYDROCHLOROTHIAZIDE (HYDRODIURIL) 25 MG TABLET] TAKE 1 TABLET BY MOUTH EVERY DAY 30 tablet 11     Lidocaine (LIDOCARE) 4 % Patch Apply 1 patch to intact skin of most painful area for upto 12 hrs within 24-hr period.           REVIEW OF SYSTEMS:  CONSTITUTIONAL:  occ slight fatigue, EYES:  neg, ENT:  neg, CV:  neg, RESPIRATORY: neg, :  neg and GI:  neg    Objective:  Temp 97.9  F (36.6  C)   Resp 16   Ht 1.448 m (4' 9\")   SpO2 90%   BMI 25.45 kg/m    Exam:  GENERAL APPEARANCE:  Alert, in no distress, cooperative  EYES:  EOM normal  RESP:  no respiratory distress  M/S:   Gait and station normal  NEURO:   CNs gross intact  PSYCH:  memory impaired , affect and mood normal    Labs:   no " recent    ASSESSMENT/PLAN:  (R53.83) Fatigue, unspecified type  (primary encounter diagnosis)  Comment: mild fatigue. Recent covid +  Plan: 1. Follow temps, O2 sats  2. Monitor for increased fatigue, decrease in act. Level  3. For fever, resp. Distress, may consider paxlovid    Electronically signed by:  GIOVANY Mills CNP                 Sincerely,        GIOVANY Mills CNP

## 2022-08-04 NOTE — PROGRESS NOTES
"[facility]  [place] Medical Record Number:  [id number]  Place of Service where encounter took place:  [facility] ([facility] [[id number]]  Chief Complaint   Patient presents with     Fatigue       HPI:    Bhakti Marrero  is a 89 year old (5/7/1930), who is being seen today for an episodic care visit.  HPI information obtained from: facility chart records, facility staff, patient report and [facility] chart review. Today's concern is: fatigue.  + covid test.  Reports minimal fatigue. Per staff, act. Level baseline. Afebrile. Resp. Status stable. O2 sats 90%.      Past Medical and Surgical History reviewed in Epic today.    MEDICATIONS:    Current Outpatient Medications   Medication Sig Dispense Refill     aspirin (ASA) 81 MG EC tablet Take 81 mg by mouth every other day        atorvastatin (LIPITOR) 20 MG tablet [ATORVASTATIN (LIPITOR) 20 MG TABLET] Take 1 tablet (20 mg total) by mouth daily. 90 tablet 3     cholecalciferol, vitamin D3, 50 mcg (2,000 unit) Tab [CHOLECALCIFEROL, VITAMIN D3, 50 MCG (2,000 UNIT) TAB] 1 p.o. daily  0     hydrochlorothiazide (HYDRODIURIL) 25 MG tablet [HYDROCHLOROTHIAZIDE (HYDRODIURIL) 25 MG TABLET] TAKE 1 TABLET BY MOUTH EVERY DAY 30 tablet 11     Lidocaine (LIDOCARE) 4 % Patch Apply 1 patch to intact skin of most painful area for upto 12 hrs within 24-hr period.           REVIEW OF SYSTEMS:  CONSTITUTIONAL:  occ slight fatigue, EYES:  neg, ENT:  neg, CV:  neg, RESPIRATORY: neg, :  neg and GI:  neg    Objective:  Temp 97.9  F (36.6  C)   Resp 16   Ht 1.448 m (4' 9\")   SpO2 90%   BMI 25.45 kg/m    Exam:  GENERAL APPEARANCE:  Alert, in no distress, cooperative  EYES:  EOM normal  RESP:  no respiratory distress  M/S:   Gait and station normal  NEURO:   CNs gross intact  PSYCH:  memory impaired , affect and mood normal    Labs:   no recent    ASSESSMENT/PLAN:  (R53.83) Fatigue, unspecified type  (primary encounter diagnosis)  Comment: " mild fatigue. Recent covid +  Plan: 1. Follow temps, O2 sats  2. Monitor for increased fatigue, decrease in act. Level  3. For fever, resp. Distress, may consider paxlovid    Electronically signed by:  GIOVANY Mills CNP

## 2022-08-31 ENCOUNTER — MEDICAL CORRESPONDENCE (OUTPATIENT)
Dept: HEALTH INFORMATION MANAGEMENT | Facility: CLINIC | Age: 87
End: 2022-08-31

## 2022-09-25 ENCOUNTER — HEALTH MAINTENANCE LETTER (OUTPATIENT)
Age: 87
End: 2022-09-25

## 2022-10-18 ENCOUNTER — LAB REQUISITION (OUTPATIENT)
Dept: LAB | Facility: CLINIC | Age: 87
End: 2022-10-18
Payer: MEDICARE

## 2022-10-18 DIAGNOSIS — F03.90 UNSPECIFIED DEMENTIA, UNSPECIFIED SEVERITY, WITHOUT BEHAVIORAL DISTURBANCE, PSYCHOTIC DISTURBANCE, MOOD DISTURBANCE, AND ANXIETY (H): ICD-10-CM

## 2022-10-18 DIAGNOSIS — D64.9 ANEMIA, UNSPECIFIED: ICD-10-CM

## 2022-10-18 DIAGNOSIS — I10 ESSENTIAL (PRIMARY) HYPERTENSION: ICD-10-CM

## 2022-10-18 LAB
ANION GAP SERPL CALCULATED.3IONS-SCNC: 14 MMOL/L (ref 7–15)
BUN SERPL-MCNC: 11.1 MG/DL (ref 8–23)
CALCIUM SERPL-MCNC: 9.9 MG/DL (ref 8.2–9.6)
CHLORIDE SERPL-SCNC: 95 MMOL/L (ref 98–107)
CREAT SERPL-MCNC: 0.76 MG/DL (ref 0.51–0.95)
DEPRECATED HCO3 PLAS-SCNC: 28 MMOL/L (ref 22–29)
ERYTHROCYTE [DISTWIDTH] IN BLOOD BY AUTOMATED COUNT: 14.6 % (ref 10–15)
GFR SERPL CREATININE-BSD FRML MDRD: 73 ML/MIN/1.73M2
GLUCOSE SERPL-MCNC: 151 MG/DL (ref 70–99)
HCT VFR BLD AUTO: 37.1 % (ref 35–47)
HGB BLD-MCNC: 11.5 G/DL (ref 11.7–15.7)
MCH RBC QN AUTO: 32.1 PG (ref 26.5–33)
MCHC RBC AUTO-ENTMCNC: 31 G/DL (ref 31.5–36.5)
MCV RBC AUTO: 104 FL (ref 78–100)
PLATELET # BLD AUTO: 233 10E3/UL (ref 150–450)
POTASSIUM SERPL-SCNC: 3.6 MMOL/L (ref 3.4–5.3)
RBC # BLD AUTO: 3.58 10E6/UL (ref 3.8–5.2)
SODIUM SERPL-SCNC: 137 MMOL/L (ref 136–145)
TSH SERPL DL<=0.005 MIU/L-ACNC: 2.3 UIU/ML (ref 0.3–4.2)
WBC # BLD AUTO: 9.3 10E3/UL (ref 4–11)

## 2022-10-18 PROCEDURE — 85027 COMPLETE CBC AUTOMATED: CPT | Performed by: NURSE PRACTITIONER

## 2022-10-18 PROCEDURE — 84443 ASSAY THYROID STIM HORMONE: CPT | Mod: ORL | Performed by: NURSE PRACTITIONER

## 2022-10-18 PROCEDURE — 80048 BASIC METABOLIC PNL TOTAL CA: CPT | Performed by: NURSE PRACTITIONER

## 2023-01-01 ENCOUNTER — HOSPITAL ENCOUNTER (INPATIENT)
Facility: CLINIC | Age: 88
LOS: 6 days | Discharge: NURSING, REHAB OR LONG-TERM CARE | DRG: 872 | End: 2023-08-01
Attending: EMERGENCY MEDICINE | Admitting: INTERNAL MEDICINE
Payer: MEDICARE

## 2023-01-01 ENCOUNTER — PATIENT OUTREACH (OUTPATIENT)
Dept: CARE COORDINATION | Facility: CLINIC | Age: 88
End: 2023-01-01
Payer: MEDICARE

## 2023-01-01 ENCOUNTER — APPOINTMENT (OUTPATIENT)
Dept: GENERAL RADIOLOGY | Facility: CLINIC | Age: 88
DRG: 872 | End: 2023-01-01
Attending: EMERGENCY MEDICINE
Payer: MEDICARE

## 2023-01-01 ENCOUNTER — APPOINTMENT (OUTPATIENT)
Dept: PHYSICAL THERAPY | Facility: CLINIC | Age: 88
DRG: 872 | End: 2023-01-01
Attending: HOSPITALIST
Payer: MEDICARE

## 2023-01-01 ENCOUNTER — APPOINTMENT (OUTPATIENT)
Dept: CT IMAGING | Facility: CLINIC | Age: 88
DRG: 872 | End: 2023-01-01
Attending: EMERGENCY MEDICINE
Payer: MEDICARE

## 2023-01-01 ENCOUNTER — APPOINTMENT (OUTPATIENT)
Dept: PHYSICAL THERAPY | Facility: CLINIC | Age: 88
DRG: 872 | End: 2023-01-01
Payer: MEDICARE

## 2023-01-01 ENCOUNTER — HEALTH MAINTENANCE LETTER (OUTPATIENT)
Age: 88
End: 2023-01-01

## 2023-01-01 VITALS
DIASTOLIC BLOOD PRESSURE: 62 MMHG | SYSTOLIC BLOOD PRESSURE: 139 MMHG | OXYGEN SATURATION: 97 % | RESPIRATION RATE: 20 BRPM | HEART RATE: 83 BPM | BODY MASS INDEX: 21.47 KG/M2 | WEIGHT: 109.35 LBS | TEMPERATURE: 98.4 F | HEIGHT: 60 IN

## 2023-01-01 DIAGNOSIS — R11.2 NAUSEA AND VOMITING, UNSPECIFIED VOMITING TYPE: ICD-10-CM

## 2023-01-01 DIAGNOSIS — A41.9 SEPSIS, DUE TO UNSPECIFIED ORGANISM, UNSPECIFIED WHETHER ACUTE ORGAN DYSFUNCTION PRESENT (H): Primary | ICD-10-CM

## 2023-01-01 DIAGNOSIS — K57.92 DIVERTICULITIS: ICD-10-CM

## 2023-01-01 LAB
ACINETOBACTER SPECIES: NOT DETECTED
ALBUMIN SERPL BCG-MCNC: 4.2 G/DL (ref 3.5–5.2)
ALBUMIN UR-MCNC: 10 MG/DL
ALP SERPL-CCNC: 64 U/L (ref 35–104)
ALT SERPL W P-5'-P-CCNC: 49 U/L (ref 0–50)
ANION GAP SERPL CALCULATED.3IONS-SCNC: 10 MMOL/L (ref 7–15)
ANION GAP SERPL CALCULATED.3IONS-SCNC: 11 MMOL/L (ref 7–15)
ANION GAP SERPL CALCULATED.3IONS-SCNC: 11 MMOL/L (ref 7–15)
ANION GAP SERPL CALCULATED.3IONS-SCNC: 12 MMOL/L (ref 7–15)
ANION GAP SERPL CALCULATED.3IONS-SCNC: 15 MMOL/L (ref 7–15)
ANION GAP SERPL CALCULATED.3IONS-SCNC: 9 MMOL/L (ref 7–15)
APPEARANCE UR: CLEAR
AST SERPL W P-5'-P-CCNC: 35 U/L (ref 0–45)
ATRIAL RATE - MUSE: 134 BPM
BACTERIA #/AREA URNS HPF: ABNORMAL /HPF
BACTERIA BLD CULT: ABNORMAL
BACTERIA BLD CULT: NO GROWTH
BACTERIA BLD CULT: NO GROWTH
BASOPHILS # BLD AUTO: 0 10E3/UL (ref 0–0.2)
BASOPHILS NFR BLD AUTO: 0 %
BILIRUB SERPL-MCNC: 0.7 MG/DL
BILIRUB UR QL STRIP: NEGATIVE
BUN SERPL-MCNC: 1.7 MG/DL (ref 8–23)
BUN SERPL-MCNC: 10.9 MG/DL (ref 8–23)
BUN SERPL-MCNC: 11.8 MG/DL (ref 8–23)
BUN SERPL-MCNC: 12 MG/DL (ref 8–23)
BUN SERPL-MCNC: 3.1 MG/DL (ref 8–23)
BUN SERPL-MCNC: <1.4 MG/DL (ref 8–23)
CALCIUM SERPL-MCNC: 7.9 MG/DL (ref 8.2–9.6)
CALCIUM SERPL-MCNC: 8.1 MG/DL (ref 8.2–9.6)
CALCIUM SERPL-MCNC: 8.2 MG/DL (ref 8.2–9.6)
CALCIUM SERPL-MCNC: 8.2 MG/DL (ref 8.2–9.6)
CALCIUM SERPL-MCNC: 8.5 MG/DL (ref 8.2–9.6)
CALCIUM SERPL-MCNC: 9.5 MG/DL (ref 8.2–9.6)
CHLORIDE SERPL-SCNC: 102 MMOL/L (ref 98–107)
CHLORIDE SERPL-SCNC: 102 MMOL/L (ref 98–107)
CHLORIDE SERPL-SCNC: 104 MMOL/L (ref 98–107)
CHLORIDE SERPL-SCNC: 104 MMOL/L (ref 98–107)
CHLORIDE SERPL-SCNC: 91 MMOL/L (ref 98–107)
CHLORIDE SERPL-SCNC: 95 MMOL/L (ref 98–107)
CITROBACTER SPECIES: NOT DETECTED
COLOR UR AUTO: YELLOW
CREAT SERPL-MCNC: 0.77 MG/DL (ref 0.51–0.95)
CREAT SERPL-MCNC: 0.78 MG/DL (ref 0.51–0.95)
CREAT SERPL-MCNC: 0.78 MG/DL (ref 0.51–0.95)
CREAT SERPL-MCNC: 0.84 MG/DL (ref 0.51–0.95)
CREAT SERPL-MCNC: 0.93 MG/DL (ref 0.51–0.95)
CREAT SERPL-MCNC: 0.99 MG/DL (ref 0.51–0.95)
CTX-M: NORMAL
DEPRECATED HCO3 PLAS-SCNC: 20 MMOL/L (ref 22–29)
DEPRECATED HCO3 PLAS-SCNC: 22 MMOL/L (ref 22–29)
DEPRECATED HCO3 PLAS-SCNC: 24 MMOL/L (ref 22–29)
DEPRECATED HCO3 PLAS-SCNC: 25 MMOL/L (ref 22–29)
DIASTOLIC BLOOD PRESSURE - MUSE: NORMAL MMHG
ENTEROBACTER SPECIES: NOT DETECTED
ENTEROCOCCUS FAECALIS: NOT DETECTED
ENTEROCOCCUS FAECIUM: NOT DETECTED
EOSINOPHIL # BLD AUTO: 0 10E3/UL (ref 0–0.7)
EOSINOPHIL NFR BLD AUTO: 0 %
ERYTHROCYTE [DISTWIDTH] IN BLOOD BY AUTOMATED COUNT: 13.2 % (ref 10–15)
ERYTHROCYTE [DISTWIDTH] IN BLOOD BY AUTOMATED COUNT: 13.3 % (ref 10–15)
ERYTHROCYTE [DISTWIDTH] IN BLOOD BY AUTOMATED COUNT: 13.3 % (ref 10–15)
ERYTHROCYTE [DISTWIDTH] IN BLOOD BY AUTOMATED COUNT: 13.5 % (ref 10–15)
ERYTHROCYTE [DISTWIDTH] IN BLOOD BY AUTOMATED COUNT: 13.5 % (ref 10–15)
ERYTHROCYTE [DISTWIDTH] IN BLOOD BY AUTOMATED COUNT: 13.8 % (ref 10–15)
ESCHERICHIA COLI: NOT DETECTED
FLUAV RNA SPEC QL NAA+PROBE: NEGATIVE
FLUBV RNA RESP QL NAA+PROBE: NEGATIVE
GFR SERPL CREATININE-BSD FRML MDRD: 53 ML/MIN/1.73M2
GFR SERPL CREATININE-BSD FRML MDRD: 57 ML/MIN/1.73M2
GFR SERPL CREATININE-BSD FRML MDRD: 64 ML/MIN/1.73M2
GFR SERPL CREATININE-BSD FRML MDRD: 70 ML/MIN/1.73M2
GFR SERPL CREATININE-BSD FRML MDRD: 70 ML/MIN/1.73M2
GFR SERPL CREATININE-BSD FRML MDRD: 72 ML/MIN/1.73M2
GLUCOSE BLDC GLUCOMTR-MCNC: 106 MG/DL (ref 70–99)
GLUCOSE BLDC GLUCOMTR-MCNC: 115 MG/DL (ref 70–99)
GLUCOSE BLDC GLUCOMTR-MCNC: 118 MG/DL (ref 70–99)
GLUCOSE BLDC GLUCOMTR-MCNC: 74 MG/DL (ref 70–99)
GLUCOSE BLDC GLUCOMTR-MCNC: 76 MG/DL (ref 70–99)
GLUCOSE BLDC GLUCOMTR-MCNC: 87 MG/DL (ref 70–99)
GLUCOSE BLDC GLUCOMTR-MCNC: 98 MG/DL (ref 70–99)
GLUCOSE SERPL-MCNC: 106 MG/DL (ref 70–99)
GLUCOSE SERPL-MCNC: 118 MG/DL (ref 70–99)
GLUCOSE SERPL-MCNC: 137 MG/DL (ref 70–99)
GLUCOSE SERPL-MCNC: 151 MG/DL (ref 70–99)
GLUCOSE SERPL-MCNC: 81 MG/DL (ref 70–99)
GLUCOSE SERPL-MCNC: 98 MG/DL (ref 70–99)
GLUCOSE UR STRIP-MCNC: NEGATIVE MG/DL
HCO3 BLDV-SCNC: 27 MMOL/L (ref 21–28)
HCT VFR BLD AUTO: 25.3 % (ref 35–47)
HCT VFR BLD AUTO: 25.5 % (ref 35–47)
HCT VFR BLD AUTO: 26.3 % (ref 35–47)
HCT VFR BLD AUTO: 26.7 % (ref 35–47)
HCT VFR BLD AUTO: 27 % (ref 35–47)
HCT VFR BLD AUTO: 33.5 % (ref 35–47)
HGB BLD-MCNC: 10.7 G/DL (ref 11.7–15.7)
HGB BLD-MCNC: 8.1 G/DL (ref 11.7–15.7)
HGB BLD-MCNC: 8.2 G/DL (ref 11.7–15.7)
HGB BLD-MCNC: 8.7 G/DL (ref 11.7–15.7)
HGB BLD-MCNC: 8.8 G/DL (ref 11.7–15.7)
HGB BLD-MCNC: 9 G/DL (ref 11.7–15.7)
HGB UR QL STRIP: ABNORMAL
HOLD SPECIMEN: NORMAL
IMM GRANULOCYTES # BLD: 0 10E3/UL
IMM GRANULOCYTES # BLD: 0.1 10E3/UL
IMM GRANULOCYTES # BLD: 0.1 10E3/UL
IMM GRANULOCYTES # BLD: 0.2 10E3/UL
IMM GRANULOCYTES NFR BLD: 1 %
IMP: NORMAL
INTERPRETATION ECG - MUSE: NORMAL
KETONES UR STRIP-MCNC: NEGATIVE MG/DL
KLEBSIELLA OXYTOCA: NOT DETECTED
KLEBSIELLA PNEUMONIAE: NOT DETECTED
KPC: NORMAL
LACTATE BLD-SCNC: 3.5 MMOL/L
LACTATE SERPL-SCNC: 1.7 MMOL/L (ref 0.7–2)
LACTATE SERPL-SCNC: 2.5 MMOL/L (ref 0.7–2)
LACTATE SERPL-SCNC: 2.8 MMOL/L (ref 0.7–2)
LACTATE SERPL-SCNC: 3.5 MMOL/L (ref 0.7–2)
LEUKOCYTE ESTERASE UR QL STRIP: ABNORMAL
LISTERIA SPECIES (DETECTED/NOT DETECTED): NOT DETECTED
LYMPHOCYTES # BLD AUTO: 0.4 10E3/UL (ref 0.8–5.3)
LYMPHOCYTES # BLD AUTO: 0.8 10E3/UL (ref 0.8–5.3)
LYMPHOCYTES # BLD AUTO: 0.9 10E3/UL (ref 0.8–5.3)
LYMPHOCYTES # BLD AUTO: 0.9 10E3/UL (ref 0.8–5.3)
LYMPHOCYTES NFR BLD AUTO: 12 %
LYMPHOCYTES NFR BLD AUTO: 17 %
LYMPHOCYTES NFR BLD AUTO: 2 %
LYMPHOCYTES NFR BLD AUTO: 7 %
MCH RBC QN AUTO: 30.6 PG (ref 26.5–33)
MCH RBC QN AUTO: 31.3 PG (ref 26.5–33)
MCH RBC QN AUTO: 31.3 PG (ref 26.5–33)
MCH RBC QN AUTO: 31.4 PG (ref 26.5–33)
MCH RBC QN AUTO: 31.6 PG (ref 26.5–33)
MCH RBC QN AUTO: 31.9 PG (ref 26.5–33)
MCHC RBC AUTO-ENTMCNC: 31.9 G/DL (ref 31.5–36.5)
MCHC RBC AUTO-ENTMCNC: 32 G/DL (ref 31.5–36.5)
MCHC RBC AUTO-ENTMCNC: 32.2 G/DL (ref 31.5–36.5)
MCHC RBC AUTO-ENTMCNC: 32.6 G/DL (ref 31.5–36.5)
MCHC RBC AUTO-ENTMCNC: 33.1 G/DL (ref 31.5–36.5)
MCHC RBC AUTO-ENTMCNC: 33.7 G/DL (ref 31.5–36.5)
MCV RBC AUTO: 94 FL (ref 78–100)
MCV RBC AUTO: 96 FL (ref 78–100)
MCV RBC AUTO: 98 FL (ref 78–100)
MCV RBC AUTO: 98 FL (ref 78–100)
MONOCYTES # BLD AUTO: 1.1 10E3/UL (ref 0–1.3)
MONOCYTES # BLD AUTO: 1.2 10E3/UL (ref 0–1.3)
MONOCYTES # BLD AUTO: 1.9 10E3/UL (ref 0–1.3)
MONOCYTES # BLD AUTO: 4.7 10E3/UL (ref 0–1.3)
MONOCYTES NFR BLD AUTO: 20 %
MONOCYTES NFR BLD AUTO: 23 %
MONOCYTES NFR BLD AUTO: 25 %
MONOCYTES NFR BLD AUTO: 9 %
MUCOUS THREADS #/AREA URNS LPF: PRESENT /LPF
NDM: NORMAL
NEUTROPHILS # BLD AUTO: 18.2 10E3/UL (ref 1.6–8.3)
NEUTROPHILS # BLD AUTO: 3 10E3/UL (ref 1.6–8.3)
NEUTROPHILS # BLD AUTO: 4.8 10E3/UL (ref 1.6–8.3)
NEUTROPHILS # BLD AUTO: 9.8 10E3/UL (ref 1.6–8.3)
NEUTROPHILS NFR BLD AUTO: 59 %
NEUTROPHILS NFR BLD AUTO: 62 %
NEUTROPHILS NFR BLD AUTO: 77 %
NEUTROPHILS NFR BLD AUTO: 83 %
NITRATE UR QL: NEGATIVE
NRBC # BLD AUTO: 0 10E3/UL
NRBC BLD AUTO-RTO: 0 /100
NT-PROBNP SERPL-MCNC: 245 PG/ML (ref 0–1800)
OXA (DETECTED/NOT DETECTED): NORMAL
P AXIS - MUSE: 47 DEGREES
PCO2 BLDV: 38 MM HG (ref 40–50)
PH BLDV: 7.45 [PH] (ref 7.32–7.43)
PH UR STRIP: 6 [PH] (ref 5–7)
PLAT MORPH BLD: NORMAL
PLATELET # BLD AUTO: 117 10E3/UL (ref 150–450)
PLATELET # BLD AUTO: 129 10E3/UL (ref 150–450)
PLATELET # BLD AUTO: 131 10E3/UL (ref 150–450)
PLATELET # BLD AUTO: 153 10E3/UL (ref 150–450)
PLATELET # BLD AUTO: 162 10E3/UL (ref 150–450)
PLATELET # BLD AUTO: 179 10E3/UL (ref 150–450)
PO2 BLDV: 21 MM HG (ref 25–47)
POTASSIUM SERPL-SCNC: 3.2 MMOL/L (ref 3.4–5.3)
POTASSIUM SERPL-SCNC: 3.3 MMOL/L (ref 3.4–5.3)
POTASSIUM SERPL-SCNC: 3.5 MMOL/L (ref 3.4–5.3)
POTASSIUM SERPL-SCNC: 3.5 MMOL/L (ref 3.4–5.3)
POTASSIUM SERPL-SCNC: 3.6 MMOL/L (ref 3.4–5.3)
POTASSIUM SERPL-SCNC: 3.6 MMOL/L (ref 3.4–5.3)
POTASSIUM SERPL-SCNC: 3.7 MMOL/L (ref 3.4–5.3)
POTASSIUM SERPL-SCNC: 3.8 MMOL/L (ref 3.4–5.3)
PR INTERVAL - MUSE: 132 MS
PROT SERPL-MCNC: 7.4 G/DL (ref 6.4–8.3)
PROTEUS SPECIES: NOT DETECTED
PSEUDOMONAS AERUGINOSA: NOT DETECTED
QRS DURATION - MUSE: 82 MS
QT - MUSE: 302 MS
QTC - MUSE: 450 MS
R AXIS - MUSE: -55 DEGREES
RADIOLOGIST FLAGS: ABNORMAL
RBC # BLD AUTO: 2.59 10E6/UL (ref 3.8–5.2)
RBC # BLD AUTO: 2.61 10E6/UL (ref 3.8–5.2)
RBC # BLD AUTO: 2.73 10E6/UL (ref 3.8–5.2)
RBC # BLD AUTO: 2.81 10E6/UL (ref 3.8–5.2)
RBC # BLD AUTO: 2.85 10E6/UL (ref 3.8–5.2)
RBC # BLD AUTO: 3.5 10E6/UL (ref 3.8–5.2)
RBC MORPH BLD: NORMAL
RBC URINE: 6 /HPF
RSV RNA SPEC NAA+PROBE: NEGATIVE
SAO2 % BLDV: 37 % (ref 94–100)
SARS-COV-2 RNA RESP QL NAA+PROBE: NEGATIVE
SODIUM SERPL-SCNC: 130 MMOL/L (ref 136–145)
SODIUM SERPL-SCNC: 131 MMOL/L (ref 136–145)
SODIUM SERPL-SCNC: 133 MMOL/L (ref 136–145)
SODIUM SERPL-SCNC: 135 MMOL/L (ref 136–145)
SODIUM SERPL-SCNC: 136 MMOL/L (ref 136–145)
SODIUM SERPL-SCNC: 136 MMOL/L (ref 136–145)
SP GR UR STRIP: 1 (ref 1–1.03)
SQUAMOUS EPITHELIAL: 5 /HPF
STAPHYLOCOCCUS AUREUS: NOT DETECTED
STAPHYLOCOCCUS EPIDERMIDIS: NOT DETECTED
STAPHYLOCOCCUS LUGDUNENSIS: NOT DETECTED
STAPHYLOCOCCUS SPECIES: NOT DETECTED
STREPTOCOCCUS AGALACTIAE: NOT DETECTED
STREPTOCOCCUS ANGINOSUS GROUP: NOT DETECTED
STREPTOCOCCUS PNEUMONIAE: NOT DETECTED
STREPTOCOCCUS PYOGENES: NOT DETECTED
STREPTOCOCCUS SPECIES: NOT DETECTED
SYSTOLIC BLOOD PRESSURE - MUSE: NORMAL MMHG
T AXIS - MUSE: 30 DEGREES
T4 FREE SERPL-MCNC: 1.08 NG/DL (ref 0.9–1.7)
TROPONIN T SERPL HS-MCNC: 13 NG/L
TSH SERPL DL<=0.005 MIU/L-ACNC: 4.49 UIU/ML (ref 0.3–4.2)
UROBILINOGEN UR STRIP-MCNC: NORMAL MG/DL
VENTRICULAR RATE- MUSE: 134 BPM
VIM: NORMAL
WBC # BLD AUTO: 11.8 10E3/UL (ref 4–11)
WBC # BLD AUTO: 23.6 10E3/UL (ref 4–11)
WBC # BLD AUTO: 5.2 10E3/UL (ref 4–11)
WBC # BLD AUTO: 5.6 10E3/UL (ref 4–11)
WBC # BLD AUTO: 7.6 10E3/UL (ref 4–11)
WBC # BLD AUTO: 7.7 10E3/UL (ref 4–11)
WBC URINE: 3 /HPF

## 2023-01-01 PROCEDURE — 87040 BLOOD CULTURE FOR BACTERIA: CPT | Performed by: EMERGENCY MEDICINE

## 2023-01-01 PROCEDURE — 36415 COLL VENOUS BLD VENIPUNCTURE: CPT | Performed by: INTERNAL MEDICINE

## 2023-01-01 PROCEDURE — 250N000011 HC RX IP 250 OP 636: Mod: JZ

## 2023-01-01 PROCEDURE — 36415 COLL VENOUS BLD VENIPUNCTURE: CPT | Performed by: EMERGENCY MEDICINE

## 2023-01-01 PROCEDURE — 250N000013 HC RX MED GY IP 250 OP 250 PS 637: Performed by: EMERGENCY MEDICINE

## 2023-01-01 PROCEDURE — 258N000003 HC RX IP 258 OP 636: Performed by: HOSPITALIST

## 2023-01-01 PROCEDURE — 250N000013 HC RX MED GY IP 250 OP 250 PS 637: Performed by: INTERNAL MEDICINE

## 2023-01-01 PROCEDURE — 99207 PR NO BILLABLE SERVICE THIS VISIT: CPT | Performed by: INTERNAL MEDICINE

## 2023-01-01 PROCEDURE — 87040 BLOOD CULTURE FOR BACTERIA: CPT | Performed by: INTERNAL MEDICINE

## 2023-01-01 PROCEDURE — C9803 HOPD COVID-19 SPEC COLLECT: HCPCS

## 2023-01-01 PROCEDURE — 99233 SBSQ HOSP IP/OBS HIGH 50: CPT | Performed by: HOSPITALIST

## 2023-01-01 PROCEDURE — 83605 ASSAY OF LACTIC ACID: CPT | Performed by: EMERGENCY MEDICINE

## 2023-01-01 PROCEDURE — 80048 BASIC METABOLIC PNL TOTAL CA: CPT | Performed by: INTERNAL MEDICINE

## 2023-01-01 PROCEDURE — 120N000001 HC R&B MED SURG/OB

## 2023-01-01 PROCEDURE — 93005 ELECTROCARDIOGRAM TRACING: CPT

## 2023-01-01 PROCEDURE — 99223 1ST HOSP IP/OBS HIGH 75: CPT | Mod: AI | Performed by: INTERNAL MEDICINE

## 2023-01-01 PROCEDURE — 85014 HEMATOCRIT: CPT | Performed by: INTERNAL MEDICINE

## 2023-01-01 PROCEDURE — 36415 COLL VENOUS BLD VENIPUNCTURE: CPT | Performed by: HOSPITALIST

## 2023-01-01 PROCEDURE — 99232 SBSQ HOSP IP/OBS MODERATE 35: CPT | Performed by: HOSPITALIST

## 2023-01-01 PROCEDURE — 250N000011 HC RX IP 250 OP 636: Performed by: INTERNAL MEDICINE

## 2023-01-01 PROCEDURE — 258N000003 HC RX IP 258 OP 636: Performed by: INTERNAL MEDICINE

## 2023-01-01 PROCEDURE — 80048 BASIC METABOLIC PNL TOTAL CA: CPT | Performed by: HOSPITALIST

## 2023-01-01 PROCEDURE — 97530 THERAPEUTIC ACTIVITIES: CPT | Mod: GP

## 2023-01-01 PROCEDURE — 85027 COMPLETE CBC AUTOMATED: CPT | Performed by: INTERNAL MEDICINE

## 2023-01-01 PROCEDURE — 87149 DNA/RNA DIRECT PROBE: CPT | Performed by: EMERGENCY MEDICINE

## 2023-01-01 PROCEDURE — 85025 COMPLETE CBC W/AUTO DIFF WBC: CPT | Performed by: INTERNAL MEDICINE

## 2023-01-01 PROCEDURE — 82803 BLOOD GASES ANY COMBINATION: CPT

## 2023-01-01 PROCEDURE — 85025 COMPLETE CBC W/AUTO DIFF WBC: CPT | Performed by: HOSPITALIST

## 2023-01-01 PROCEDURE — 258N000003 HC RX IP 258 OP 636: Performed by: EMERGENCY MEDICINE

## 2023-01-01 PROCEDURE — 250N000011 HC RX IP 250 OP 636: Performed by: EMERGENCY MEDICINE

## 2023-01-01 PROCEDURE — 36415 COLL VENOUS BLD VENIPUNCTURE: CPT

## 2023-01-01 PROCEDURE — 84439 ASSAY OF FREE THYROXINE: CPT | Performed by: EMERGENCY MEDICINE

## 2023-01-01 PROCEDURE — 71046 X-RAY EXAM CHEST 2 VIEWS: CPT

## 2023-01-01 PROCEDURE — 99285 EMERGENCY DEPT VISIT HI MDM: CPT | Mod: 25

## 2023-01-01 PROCEDURE — 96368 THER/DIAG CONCURRENT INF: CPT

## 2023-01-01 PROCEDURE — 99232 SBSQ HOSP IP/OBS MODERATE 35: CPT | Performed by: INTERNAL MEDICINE

## 2023-01-01 PROCEDURE — 96366 THER/PROPH/DIAG IV INF ADDON: CPT

## 2023-01-01 PROCEDURE — 80053 COMPREHEN METABOLIC PANEL: CPT | Performed by: EMERGENCY MEDICINE

## 2023-01-01 PROCEDURE — 97530 THERAPEUTIC ACTIVITIES: CPT | Mod: GP | Performed by: PHYSICAL THERAPIST

## 2023-01-01 PROCEDURE — 83605 ASSAY OF LACTIC ACID: CPT

## 2023-01-01 PROCEDURE — 99207 PR NO BILLABLE SERVICE THIS VISIT: CPT | Performed by: HOSPITALIST

## 2023-01-01 PROCEDURE — 74177 CT ABD & PELVIS W/CONTRAST: CPT | Mod: MG

## 2023-01-01 PROCEDURE — 84132 ASSAY OF SERUM POTASSIUM: CPT | Performed by: HOSPITALIST

## 2023-01-01 PROCEDURE — 84484 ASSAY OF TROPONIN QUANT: CPT | Performed by: EMERGENCY MEDICINE

## 2023-01-01 PROCEDURE — 83880 ASSAY OF NATRIURETIC PEPTIDE: CPT | Performed by: EMERGENCY MEDICINE

## 2023-01-01 PROCEDURE — 81001 URINALYSIS AUTO W/SCOPE: CPT | Performed by: HOSPITALIST

## 2023-01-01 PROCEDURE — 99239 HOSP IP/OBS DSCHRG MGMT >30: CPT | Performed by: INTERNAL MEDICINE

## 2023-01-01 PROCEDURE — 250N000013 HC RX MED GY IP 250 OP 250 PS 637: Performed by: HOSPITALIST

## 2023-01-01 PROCEDURE — 85025 COMPLETE CBC W/AUTO DIFF WBC: CPT | Performed by: EMERGENCY MEDICINE

## 2023-01-01 PROCEDURE — 97112 NEUROMUSCULAR REEDUCATION: CPT | Mod: GP

## 2023-01-01 PROCEDURE — 87637 SARSCOV2&INF A&B&RSV AMP PRB: CPT | Performed by: EMERGENCY MEDICINE

## 2023-01-01 PROCEDURE — 84443 ASSAY THYROID STIM HORMONE: CPT | Performed by: EMERGENCY MEDICINE

## 2023-01-01 PROCEDURE — 83605 ASSAY OF LACTIC ACID: CPT | Performed by: HOSPITALIST

## 2023-01-01 PROCEDURE — 96365 THER/PROPH/DIAG IV INF INIT: CPT | Mod: 59

## 2023-01-01 PROCEDURE — 97161 PT EVAL LOW COMPLEX 20 MIN: CPT | Mod: GP

## 2023-01-01 PROCEDURE — G1010 CDSM STANSON: HCPCS

## 2023-01-01 PROCEDURE — 84132 ASSAY OF SERUM POTASSIUM: CPT | Performed by: INTERNAL MEDICINE

## 2023-01-01 PROCEDURE — 250N000011 HC RX IP 250 OP 636: Mod: JZ | Performed by: INTERNAL MEDICINE

## 2023-01-01 RX ORDER — METRONIDAZOLE 500 MG/1
500 TABLET ORAL 3 TIMES DAILY
Status: DISCONTINUED | OUTPATIENT
Start: 2023-01-01 | End: 2023-01-01 | Stop reason: HOSPADM

## 2023-01-01 RX ORDER — POTASSIUM CHLORIDE 1500 MG/1
20 TABLET, EXTENDED RELEASE ORAL ONCE
Status: COMPLETED | OUTPATIENT
Start: 2023-01-01 | End: 2023-01-01

## 2023-01-01 RX ORDER — POTASSIUM CHLORIDE 1.5 G/1.58G
20 POWDER, FOR SOLUTION ORAL ONCE
Status: COMPLETED | OUTPATIENT
Start: 2023-01-01 | End: 2023-01-01

## 2023-01-01 RX ORDER — CIPROFLOXACIN 500 MG/1
500 TABLET, FILM COATED ORAL EVERY 12 HOURS SCHEDULED
Status: DISCONTINUED | OUTPATIENT
Start: 2023-01-01 | End: 2023-01-01 | Stop reason: HOSPADM

## 2023-01-01 RX ORDER — NICOTINE POLACRILEX 4 MG
15-30 LOZENGE BUCCAL
Status: DISCONTINUED | OUTPATIENT
Start: 2023-01-01 | End: 2023-01-01 | Stop reason: HOSPADM

## 2023-01-01 RX ORDER — HYDROCHLOROTHIAZIDE 12.5 MG/1
12.5 CAPSULE ORAL DAILY
COMMUNITY

## 2023-01-01 RX ORDER — TRAZODONE HYDROCHLORIDE 50 MG/1
25 TABLET, FILM COATED ORAL AT BEDTIME
COMMUNITY
Start: 2023-01-01

## 2023-01-01 RX ORDER — DEXTROSE MONOHYDRATE, SODIUM CHLORIDE, AND POTASSIUM CHLORIDE 50; 1.49; 4.5 G/1000ML; G/1000ML; G/1000ML
INJECTION, SOLUTION INTRAVENOUS CONTINUOUS
Status: DISCONTINUED | OUTPATIENT
Start: 2023-01-01 | End: 2023-01-01

## 2023-01-01 RX ORDER — PIPERACILLIN SODIUM, TAZOBACTAM SODIUM 2; .25 G/10ML; G/10ML
2.25 INJECTION, POWDER, LYOPHILIZED, FOR SOLUTION INTRAVENOUS EVERY 6 HOURS
Status: DISCONTINUED | OUTPATIENT
Start: 2023-01-01 | End: 2023-01-01

## 2023-01-01 RX ORDER — LIDOCAINE 40 MG/G
CREAM TOPICAL
Status: DISCONTINUED | OUTPATIENT
Start: 2023-01-01 | End: 2023-01-01 | Stop reason: HOSPADM

## 2023-01-01 RX ORDER — METRONIDAZOLE 500 MG/1
500 TABLET ORAL 3 TIMES DAILY
Qty: 24 TABLET | Refills: 0 | DISCHARGE
Start: 2023-01-01 | End: 2023-01-01

## 2023-01-01 RX ORDER — VANCOMYCIN HYDROCHLORIDE 1 G/200ML
1000 INJECTION, SOLUTION INTRAVENOUS ONCE
Status: COMPLETED | OUTPATIENT
Start: 2023-01-01 | End: 2023-01-01

## 2023-01-01 RX ORDER — CIPROFLOXACIN 500 MG/1
500 TABLET, FILM COATED ORAL EVERY 12 HOURS
Qty: 16 TABLET | Refills: 0 | Status: SHIPPED | OUTPATIENT
Start: 2023-01-01

## 2023-01-01 RX ORDER — ONDANSETRON 4 MG/1
4 TABLET, ORALLY DISINTEGRATING ORAL EVERY 6 HOURS PRN
Status: DISCONTINUED | OUTPATIENT
Start: 2023-01-01 | End: 2023-01-01 | Stop reason: HOSPADM

## 2023-01-01 RX ORDER — CIPROFLOXACIN 500 MG/1
500 TABLET, FILM COATED ORAL EVERY 12 HOURS
Qty: 16 TABLET | Refills: 0 | DISCHARGE
Start: 2023-01-01 | End: 2023-01-01

## 2023-01-01 RX ORDER — SODIUM CHLORIDE 9 MG/ML
INJECTION, SOLUTION INTRAVENOUS CONTINUOUS
Status: DISCONTINUED | OUTPATIENT
Start: 2023-01-01 | End: 2023-01-01

## 2023-01-01 RX ORDER — ONDANSETRON 2 MG/ML
4 INJECTION INTRAMUSCULAR; INTRAVENOUS EVERY 6 HOURS PRN
Status: DISCONTINUED | OUTPATIENT
Start: 2023-01-01 | End: 2023-01-01 | Stop reason: HOSPADM

## 2023-01-01 RX ORDER — METRONIDAZOLE 500 MG/1
500 TABLET ORAL 3 TIMES DAILY
Qty: 24 TABLET | Refills: 0 | Status: SHIPPED | OUTPATIENT
Start: 2023-01-01

## 2023-01-01 RX ORDER — DEXTROSE MONOHYDRATE 25 G/50ML
25-50 INJECTION, SOLUTION INTRAVENOUS
Status: DISCONTINUED | OUTPATIENT
Start: 2023-01-01 | End: 2023-01-01 | Stop reason: HOSPADM

## 2023-01-01 RX ORDER — ACETAMINOPHEN 325 MG/1
650 TABLET ORAL EVERY 6 HOURS PRN
Status: DISCONTINUED | OUTPATIENT
Start: 2023-01-01 | End: 2023-01-01 | Stop reason: HOSPADM

## 2023-01-01 RX ORDER — ACETAMINOPHEN 325 MG/1
975 TABLET ORAL ONCE
Status: COMPLETED | OUTPATIENT
Start: 2023-01-01 | End: 2023-01-01

## 2023-01-01 RX ORDER — ACETAMINOPHEN 650 MG/1
650 SUPPOSITORY RECTAL EVERY 6 HOURS PRN
Status: DISCONTINUED | OUTPATIENT
Start: 2023-01-01 | End: 2023-01-01 | Stop reason: HOSPADM

## 2023-01-01 RX ORDER — IOPAMIDOL 755 MG/ML
500 INJECTION, SOLUTION INTRAVASCULAR ONCE
Status: COMPLETED | OUTPATIENT
Start: 2023-01-01 | End: 2023-01-01

## 2023-01-01 RX ADMIN — METRONIDAZOLE 500 MG: 500 TABLET ORAL at 21:30

## 2023-01-01 RX ADMIN — CIPROFLOXACIN 500 MG: 500 TABLET, FILM COATED ORAL at 08:19

## 2023-01-01 RX ADMIN — SODIUM CHLORIDE 500 ML: 9 INJECTION, SOLUTION INTRAVENOUS at 05:42

## 2023-01-01 RX ADMIN — PIPERACILLIN AND TAZOBACTAM 2.25 G: 2; .25 INJECTION, POWDER, FOR SOLUTION INTRAVENOUS at 04:57

## 2023-01-01 RX ADMIN — POTASSIUM CHLORIDE, DEXTROSE MONOHYDRATE AND SODIUM CHLORIDE: 150; 5; 450 INJECTION, SOLUTION INTRAVENOUS at 04:56

## 2023-01-01 RX ADMIN — TAZOBACTAM SODIUM AND PIPERACILLIN SODIUM 2.25 G: 250; 2 INJECTION, SOLUTION INTRAVENOUS at 23:33

## 2023-01-01 RX ADMIN — PIPERACILLIN AND TAZOBACTAM 2.25 G: 2; .25 INJECTION, POWDER, FOR SOLUTION INTRAVENOUS at 17:05

## 2023-01-01 RX ADMIN — PIPERACILLIN AND TAZOBACTAM 2.25 G: 2; .25 INJECTION, POWDER, FOR SOLUTION INTRAVENOUS at 23:27

## 2023-01-01 RX ADMIN — CIPROFLOXACIN 500 MG: 500 TABLET, FILM COATED ORAL at 14:34

## 2023-01-01 RX ADMIN — POTASSIUM CHLORIDE 20 MEQ: 1500 TABLET, EXTENDED RELEASE ORAL at 13:01

## 2023-01-01 RX ADMIN — PIPERACILLIN AND TAZOBACTAM 2.25 G: 2; .25 INJECTION, POWDER, FOR SOLUTION INTRAVENOUS at 22:31

## 2023-01-01 RX ADMIN — PIPERACILLIN AND TAZOBACTAM 2.25 G: 2; .25 INJECTION, POWDER, FOR SOLUTION INTRAVENOUS at 06:17

## 2023-01-01 RX ADMIN — PIPERACILLIN AND TAZOBACTAM 2.25 G: 2; .25 INJECTION, POWDER, FOR SOLUTION INTRAVENOUS at 11:43

## 2023-01-01 RX ADMIN — ACETAMINOPHEN 650 MG: 325 TABLET, FILM COATED ORAL at 10:43

## 2023-01-01 RX ADMIN — POTASSIUM CHLORIDE, DEXTROSE MONOHYDRATE AND SODIUM CHLORIDE: 150; 5; 450 INJECTION, SOLUTION INTRAVENOUS at 12:12

## 2023-01-01 RX ADMIN — PIPERACILLIN AND TAZOBACTAM 2.25 G: 2; .25 INJECTION, POWDER, FOR SOLUTION INTRAVENOUS at 11:32

## 2023-01-01 RX ADMIN — ONDANSETRON 4 MG: 4 TABLET, ORALLY DISINTEGRATING ORAL at 04:41

## 2023-01-01 RX ADMIN — ACETAMINOPHEN 975 MG: 325 TABLET, FILM COATED ORAL at 22:56

## 2023-01-01 RX ADMIN — TAZOBACTAM SODIUM AND PIPERACILLIN SODIUM 2.25 G: 250; 2 INJECTION, SOLUTION INTRAVENOUS at 17:38

## 2023-01-01 RX ADMIN — SODIUM CHLORIDE, POTASSIUM CHLORIDE, SODIUM LACTATE AND CALCIUM CHLORIDE 1000 ML: 600; 310; 30; 20 INJECTION, SOLUTION INTRAVENOUS at 23:14

## 2023-01-01 RX ADMIN — PIPERACILLIN SODIUM AND TAZOBACTAM SODIUM 3.38 G: 3; .375 INJECTION, SOLUTION INTRAVENOUS at 22:52

## 2023-01-01 RX ADMIN — METRONIDAZOLE 500 MG: 500 TABLET ORAL at 17:21

## 2023-01-01 RX ADMIN — PIPERACILLIN AND TAZOBACTAM 2.25 G: 2; .25 INJECTION, POWDER, FOR SOLUTION INTRAVENOUS at 16:57

## 2023-01-01 RX ADMIN — POTASSIUM CHLORIDE 20 MEQ: 1500 TABLET, EXTENDED RELEASE ORAL at 09:33

## 2023-01-01 RX ADMIN — TAZOBACTAM SODIUM AND PIPERACILLIN SODIUM 2.25 G: 250; 2 INJECTION, SOLUTION INTRAVENOUS at 04:42

## 2023-01-01 RX ADMIN — METRONIDAZOLE 500 MG: 500 TABLET ORAL at 08:19

## 2023-01-01 RX ADMIN — POTASSIUM CHLORIDE, DEXTROSE MONOHYDRATE AND SODIUM CHLORIDE: 150; 5; 450 INJECTION, SOLUTION INTRAVENOUS at 16:52

## 2023-01-01 RX ADMIN — POTASSIUM CHLORIDE, DEXTROSE MONOHYDRATE AND SODIUM CHLORIDE: 150; 5; 450 INJECTION, SOLUTION INTRAVENOUS at 20:29

## 2023-01-01 RX ADMIN — TAZOBACTAM SODIUM AND PIPERACILLIN SODIUM 2.25 G: 250; 2 INJECTION, SOLUTION INTRAVENOUS at 06:15

## 2023-01-01 RX ADMIN — POTASSIUM CHLORIDE, DEXTROSE MONOHYDRATE AND SODIUM CHLORIDE: 150; 5; 450 INJECTION, SOLUTION INTRAVENOUS at 06:34

## 2023-01-01 RX ADMIN — PIPERACILLIN AND TAZOBACTAM 2.25 G: 2; .25 INJECTION, POWDER, FOR SOLUTION INTRAVENOUS at 17:08

## 2023-01-01 RX ADMIN — SODIUM CHLORIDE 1000 ML: 9 INJECTION, SOLUTION INTRAVENOUS at 01:00

## 2023-01-01 RX ADMIN — POTASSIUM CHLORIDE FOR ORAL SOLUTION 20 MEQ: 1.5 POWDER, FOR SOLUTION ORAL at 16:52

## 2023-01-01 RX ADMIN — PIPERACILLIN AND TAZOBACTAM 2.25 G: 2; .25 INJECTION, POWDER, FOR SOLUTION INTRAVENOUS at 22:12

## 2023-01-01 RX ADMIN — IOPAMIDOL 50 ML: 755 INJECTION, SOLUTION INTRAVENOUS at 23:43

## 2023-01-01 RX ADMIN — CIPROFLOXACIN 500 MG: 500 TABLET, FILM COATED ORAL at 21:30

## 2023-01-01 RX ADMIN — ONDANSETRON 4 MG: 4 TABLET, ORALLY DISINTEGRATING ORAL at 22:51

## 2023-01-01 RX ADMIN — SODIUM CHLORIDE 250 ML: 9 INJECTION, SOLUTION INTRAVENOUS at 08:21

## 2023-01-01 RX ADMIN — TAZOBACTAM SODIUM AND PIPERACILLIN SODIUM 2.25 G: 250; 2 INJECTION, SOLUTION INTRAVENOUS at 10:51

## 2023-01-01 RX ADMIN — PIPERACILLIN AND TAZOBACTAM 2.25 G: 2; .25 INJECTION, POWDER, FOR SOLUTION INTRAVENOUS at 10:40

## 2023-01-01 RX ADMIN — VANCOMYCIN HYDROCHLORIDE 1000 MG: 1 INJECTION, SOLUTION INTRAVENOUS at 23:14

## 2023-01-01 RX ADMIN — POTASSIUM CHLORIDE, DEXTROSE MONOHYDRATE AND SODIUM CHLORIDE: 150; 5; 450 INJECTION, SOLUTION INTRAVENOUS at 18:05

## 2023-01-01 RX ADMIN — SODIUM CHLORIDE 500 ML: 9 INJECTION, SOLUTION INTRAVENOUS at 02:34

## 2023-01-01 RX ADMIN — PIPERACILLIN AND TAZOBACTAM 2.25 G: 2; .25 INJECTION, POWDER, FOR SOLUTION INTRAVENOUS at 04:48

## 2023-01-01 ASSESSMENT — ACTIVITIES OF DAILY LIVING (ADL)
ADLS_ACUITY_SCORE: 49
WEAR_GLASSES_OR_BLIND: NO
TOILETING: 2-->COMPLETELY DEPENDENT
ADLS_ACUITY_SCORE: 51
ADLS_ACUITY_SCORE: 49
ADLS_ACUITY_SCORE: 50
ADLS_ACUITY_SCORE: 35
ADLS_ACUITY_SCORE: 54
ADLS_ACUITY_SCORE: 50
ADLS_ACUITY_SCORE: 51
ADLS_ACUITY_SCORE: 50
CONCENTRATING,_REMEMBERING_OR_MAKING_DECISIONS_DIFFICULTY: YES
BATHING: 1-->ASSISTANCE NEEDED
ADLS_ACUITY_SCORE: 49
ADLS_ACUITY_SCORE: 51
CHANGE_IN_FUNCTIONAL_STATUS_SINCE_ONSET_OF_CURRENT_ILLNESS/INJURY: YES
TOILETING_ASSISTANCE: TOILETING DIFFICULTY, ASSISTANCE 1 PERSON
ADLS_ACUITY_SCORE: 53
ADLS_ACUITY_SCORE: 53
ADLS_ACUITY_SCORE: 51
ADLS_ACUITY_SCORE: 53
ADLS_ACUITY_SCORE: 47
HEARING_DIFFICULTY_OR_DEAF: NO
ADLS_ACUITY_SCORE: 53
ADLS_ACUITY_SCORE: 51
ADLS_ACUITY_SCORE: 53
TRANSFERRING: 2-->COMPLETELY DEPENDENT (NOT DEVELOPMENTALLY APPROPRIATE)
ADLS_ACUITY_SCORE: 50
ADLS_ACUITY_SCORE: 51
ADLS_ACUITY_SCORE: 49
ADLS_ACUITY_SCORE: 47
ADLS_ACUITY_SCORE: 53
DRESS: 1-->ASSISTANCE (EQUIPMENT/PERSON) NEEDED
TOILETING_ISSUES: YES
ADLS_ACUITY_SCORE: 53
EQUIPMENT_CURRENTLY_USED_AT_HOME: WALKER, ROLLING;SHOWER CHAIR
ADLS_ACUITY_SCORE: 53
ADLS_ACUITY_SCORE: 52
ADLS_ACUITY_SCORE: 54
ADLS_ACUITY_SCORE: 49
ADLS_ACUITY_SCORE: 50
ADLS_ACUITY_SCORE: 51
DRESSING/BATHING_DIFFICULTY: YES
ADLS_ACUITY_SCORE: 50
ADLS_ACUITY_SCORE: 35
ADLS_ACUITY_SCORE: 54
ADLS_ACUITY_SCORE: 35
ADLS_ACUITY_SCORE: 51
ADLS_ACUITY_SCORE: 51
ADLS_ACUITY_SCORE: 50
NUMBER_OF_TIMES_PATIENT_HAS_FALLEN_WITHIN_LAST_SIX_MONTHS: 1
ADLS_ACUITY_SCORE: 53
ADLS_ACUITY_SCORE: 51
ADLS_ACUITY_SCORE: 50
WALKING_OR_CLIMBING_STAIRS_DIFFICULTY: YES
TRANSFERRING: 2-->COMPLETELY DEPENDENT
ADLS_ACUITY_SCORE: 53
ADLS_ACUITY_SCORE: 53
ADLS_ACUITY_SCORE: 51
ADLS_ACUITY_SCORE: 50
DEPENDENT_IADLS:: CLEANING;COOKING;LAUNDRY;SHOPPING;MEAL PREPARATION;MEDICATION MANAGEMENT;MONEY MANAGEMENT;TRANSPORTATION;INCONTINENCE
DOING_ERRANDS_INDEPENDENTLY_DIFFICULTY: YES
DRESS: 1-->ASSISTANCE (EQUIPMENT/PERSON) NEEDED (NOT DEVELOPMENTALLY APPROPRIATE)
DRESSING/BATHING: BATHING DIFFICULTY, ASSISTANCE 1 PERSON;DRESSING DIFFICULTY, ASSISTANCE 1 PERSON
ADLS_ACUITY_SCORE: 49
FALL_HISTORY_WITHIN_LAST_SIX_MONTHS: YES
ADLS_ACUITY_SCORE: 51
ADLS_ACUITY_SCORE: 53
ADLS_ACUITY_SCORE: 51
ADLS_ACUITY_SCORE: 53
ADLS_ACUITY_SCORE: 50
ADLS_ACUITY_SCORE: 47
WALKING_OR_CLIMBING_STAIRS: AMBULATION DIFFICULTY, REQUIRES EQUIPMENT;TRANSFERRING DIFFICULTY, REQUIRES EQUIPMENT;STAIR CLIMBING DIFFICULTY, REQUIRES EQUIPMENT
TOILETING: 2-->COMPLETELY DEPENDENT (NOT DEVELOPMENTALLY APPROPRIATE)
ADLS_ACUITY_SCORE: 51
ADLS_ACUITY_SCORE: 53
ADLS_ACUITY_SCORE: 50
ADLS_ACUITY_SCORE: 53
DIFFICULTY_COMMUNICATING: NO
ADLS_ACUITY_SCORE: 50
DIFFICULTY_EATING/SWALLOWING: NO
ADLS_ACUITY_SCORE: 53
ADLS_ACUITY_SCORE: 35

## 2023-01-01 ASSESSMENT — ENCOUNTER SYMPTOMS
COUGH: 0
DIARRHEA: 0
NAUSEA: 1
DYSURIA: 0
HEADACHES: 0
FEVER: 1
VOMITING: 1
SHORTNESS OF BREATH: 0
DIZZINESS: 0
NECK PAIN: 0
BACK PAIN: 0
HEMATURIA: 0

## 2023-01-03 NOTE — TELEPHONE ENCOUNTER
"Former patient of Gurwinder & has not established care with another provider.  Please assign refill request to covering provider per Clinic standard process.    Routing refill request to provider for review/approval because:  Labs not current:  Multiple   No pcp    Last Written Prescription Date:  4/28/21  Last Fill Quantity: 90,  # refills: 0   Last office visit provider:  2/16/21     Requested Prescriptions   Pending Prescriptions Disp Refills     hydrochlorothiazide (HYDRODIURIL) 25 MG tablet 90 tablet 0       Diuretics (Including Combos) Protocol Failed - 7/22/2021  8:27 AM        Failed - Normal serum creatinine on file in past 12 months     Recent Labs   Lab Test 10/03/19  0955   CR 0.80              Failed - Normal serum potassium on file in past 12 months     Recent Labs   Lab Test 10/03/19  0955   POTASSIUM 4.4                    Failed - Normal serum sodium on file in past 12 months     Recent Labs   Lab Test 10/03/19  0955                 Passed - Blood pressure under 140/90 in past 12 months     BP Readings from Last 3 Encounters:   07/23/21 122/67   03/11/21 105/60   02/16/21 137/82                 Passed - Recent (12 mo) or future (30 days) visit within the authorizing provider's specialty     Patient has had an office visit with the authorizing provider or a provider within the authorizing providers department within the previous 12 mos or has a future within next 30 days. See \"Patient Info\" tab in inbasket, or \"Choose Columns\" in Meds & Orders section of the refill encounter.              Passed - Medication is active on med list        Passed - Patient is age 18 or older        Passed - No active pregancy on record        Passed - No positive pregnancy test in past 12 months             Reid Villarreal RN 07/26/21 2:20 PM  " [phone]

## 2023-07-26 PROBLEM — R11.2 NAUSEA AND VOMITING, UNSPECIFIED VOMITING TYPE: Status: ACTIVE | Noted: 2023-01-01

## 2023-07-26 PROBLEM — A41.9 SEPSIS, DUE TO UNSPECIFIED ORGANISM, UNSPECIFIED WHETHER ACUTE ORGAN DYSFUNCTION PRESENT (H): Status: ACTIVE | Noted: 2023-01-01

## 2023-07-27 NOTE — ED TRIAGE NOTES
Came via EMS from [facility]. Lives there with her . Pt N/V since lunch per staff. Per patient she has felt feverish for the past few days. EMS placed PIV Left AC 20 g. Gave 4 mg Zofran. . EKG Afib/Aflutter.     Triage Assessment       Row Name 07/26/23 2052       Triage Assessment (Adult)    Airway WDL WDL       Respiratory WDL    Respiratory WDL WDL       Skin Circulation/Temperature WDL    Skin Circulation/Temperature WDL WDL       Cardiac WDL    Cardiac WDL X;rhythm    Pulse Rate & Regularity tachycardic       Peripheral/Neurovascular WDL    Peripheral Neurovascular WDL WDL       Cognitive/Neuro/Behavioral WDL    Cognitive/Neuro/Behavioral WDL X;orientation    Level of Consciousness confused    Orientation disoriented to;time;situation    Mood/Behavior calm

## 2023-07-27 NOTE — ED NOTES
I was called by radiology and notified of the finding of diverticulitis with likely surrounding contained abscess.  Antibiotics chosen were appropriate to cover thi.  I updated the hospitalist team as well as the patient's son who is at the bedside.  Questions answered.  The patient remains hemodynamically stable.  Tachycardia is improving.  Continued medical care with likely interventional radiology consultation in the morning     Edy Lai MD  07/27/23 0040

## 2023-07-27 NOTE — PROGRESS NOTES
[facility]    Hospitalist Progress Note    Date of Service (when I saw the patient): 07/27/2023  Provider:  Bhupinder Dutton MD   Text Page  7am - 6PM       Assessment & Plan   Bhakti Marrero is a 89 year old female admitted on 7/26/2023. She has a hx of dementia, breast ca, HTN, HLP who presents with N/V. She has been throwing up since lunch time. In ED noted to have a fever to 103.  The patient is a poor historian but, per her son she has had UTIs in the past.  She is noted to be febrile and tachycardic here.  Her white cell count was elevated 11.8.  COVID test was negative.  Her lactic acid was 3.5.  She was given 1 L of crystalloids.  Placed on broad-spectrum antibiotic coverage.  I discussed her care with , and I am asked to admit her.     1. Acute colonic diverticulitis/Sepsis/possible microperforation with abscess formation.  -CT abdomen pelvis findings below.  -Chest x-ray shows no focal infiltrate.  -Zosyn IV  -Continue IV fluid support    -CRS consult and involvement appreciated.   -APAP for pain and fever     2.  Sepsis 2/2 to above.  Leukocytosis, elevated LA, tachycardia, DANIEL.     3  Hyponatremia /hypokalemia/hypochloremia  -Monitor and replacement per protocol.     4. DANIEL on CKD stage II.   - Suspect 2/2 sepsis and fluid depletion.  - Hydration, ABX, no NSAID or nephrotoxic.     5. Essential hypertension.  - hydrochlorothiazide on hold     6. Worsening of chronic anemia in the context os sepsis.   Close follow up     Diet:   NPO.     Ward Catheter: Not present  Lines: None     Cardiac Monitoring: None  Code Status:   DNR/DNI.     Clinically Significant Risk Factors Present on Admission         # Hypokalemia: Lowest K = 3.2 mmol/L in last 2 days, will replace as needed         # Drug Induced Platelet Defect: home medication list includes an antiplatelet medication   # Hypertension: Noted on problem list         DVT Prophylaxis: Pneumatic Compression Devices  Code Status:  No CPR- Do NOT Intubate    Disposition: Expected discharge once resoled and po tolerance.    Interval History   She feels better, no fever/pain or distension at the     -Data reviewed today: I reviewed all new labs and imaging results over the last 24 hours.     Physical Exam   Temp: 97.9  F (36.6  C) Temp src: Oral BP: 93/45 Pulse: 89   Resp: 16 SpO2: 98 % O2 Device: None (Room air)    Vitals:    07/26/23 2100 07/27/23 0210   Weight: 45.4 kg (100 lb) 49.6 kg (109 lb 5.6 oz)     Vital Signs with Ranges  Temp:  [97.7  F (36.5  C)-103.1  F (39.5  C)] 97.9  F (36.6  C)  Pulse:  [] 89  Resp:  [16] 16  BP: ()/(41-74) 93/45  SpO2:  [91 %-100 %] 98 %  No intake/output data recorded.    GEN:  Alert, oriented x 3, appears comfortable, NAD.  HEENT:  Normocephalic/atraumatic, no scleral icterus, no nasal discharge, mouth moist.  CV:  Regular rate and rhythm, no murmur or JVD.  S1 + S2 noted, no S3 or S4.  LUNGS:  Clear to auscultation bilaterally without rales/rhonchi/wheezing/retractions.  Symmetric chest rise on inhalation noted.  ABD:  Active bowel sounds, soft, non-tender/non-distended.  No rebound/guarding/rigidity.  EXT:  No edema or cyanosis.  No joint synovitis noted.  SKIN:  Dry to touch, no exanthems noted in the visualized areas.       Medications    sodium chloride        piperacillin-tazobactam  2.25 g Intravenous Q6H    sodium chloride (PF)  3 mL Intracatheter Q8H       Data   Recent Labs   Lab 07/27/23  0555 07/26/23  2115   WBC 23.6* 11.8*   HGB 8.7* 10.7*   MCV 96 96    179   * 131*   POTASSIUM 3.2* 3.2*   CHLORIDE 95* 91*   CO2 24 25   BUN 11.8 12.0   CR 0.99* 0.84   ANIONGAP 11 15   RAGINI 8.2 9.5   * 118*   ALBUMIN  --  4.2   PROTTOTAL  --  7.4   BILITOTAL  --  0.7   ALKPHOS  --  64   ALT  --  49   AST  --  35       Recent Results (from the past 24 hour(s))   XR Chest 2 Views    Narrative    EXAM: XR CHEST 2 VIEWS  LOCATION: [facility]  DATE:  7/26/2023    INDICATION: sepsis  COMPARISON: None.      Impression    IMPRESSION: No focal pulmonary consolidation or effusion. Normal heart size without pulmonary vascular congestion. No pneumothorax. Degenerative changes of the thoracic spine.   CT Abdomen Pelvis w Contrast   Result Value    Radiologist flags Perforated viscus (AA)    Narrative    EXAM: CT ABDOMEN PELVIS W CONTRAST  LOCATION: [facility]  DATE: 7/27/2023    INDICATION: nausea vomiting, fever  COMPARISON: None.  TECHNIQUE: CT scan of the abdomen and pelvis was performed following injection of IV contrast. Multiplanar reformats were obtained. Dose reduction techniques were used.  CONTRAST: 50 mL Isovue 370    FINDINGS:   LOWER CHEST: Tortuous, calcified descending thoracic aorta.    HEPATOBILIARY: Distended gallbladder. No calcified gallstones. Normal liver.    PANCREAS: Normal.    SPLEEN: Normal.    ADRENAL GLANDS: Normal.    KIDNEYS/BLADDER: Benign right renal cysts requiring no follow-up. No hydronephrosis. Bladder is normal.    BOWEL: Normal appendix. Normal caliber small bowel. Colonic diverticulosis. Wall thickening and pericolonic edema involving the redundant proximal sigmoid colon. There is a 4 x 2 cm extraluminal collection which is predominantly gasless with scant fluid   along the anterior wall of the proximal sigmoid colon on axial image 133. No ivette pneumoperitoneum.    LYMPH NODES: Normal.    VASCULATURE: Aortoiliac atherosclerotic calcification without aneurysm.    PELVIC ORGANS: Calcified uterine fibroids.    MUSCULOSKELETAL: Osteopenia with degenerative disc change of the visualized spine.      Impression    IMPRESSION:   1.  Findings consistent with proximal sigmoid diverticulitis complicated by contained perforation as above.      [Critical Result: Perforated viscus]    Finding was identified on 7/27/2023 12:14 AM CDT.     1.  Dr. Chavarria was contacted by me on 7/27/2023 12:22 AM CDT and verbalized  understanding of the critical result.          Securely message with the Vocera Web Console (learn more here)  Text page via [facility] Paging/Directory        Disclaimer: This note consists of symbols derived from keyboarding, dictation and/or voice recognition software. As a result, there may be errors in the script that have gone undetected. Please consider this when interpreting information found in this chart.

## 2023-07-27 NOTE — H&P
[facility]    History and Physical - Hospitalist Service       Date of Admission:  7/26/2023    Assessment & Plan      Bhakti Marrero is a 89 year old female admitted on 7/26/2023. She has a hx of dementia, breast ca, HTN, HLP who presents with N/V. She has been throwing up since lunch time. In ED noted to have a fever to 103.  The patient is a poor historian but, per her son she has had UTIs in the past.  She is noted to be febrile and tachycardic here.  Her white cell count was elevated 11.8.  COVID test was negative.  Her lactic acid was 3.5.  She was given 1 L of crystalloids.  Placed on broad-spectrum antibiotic coverage.  I discussed her care with , and I am asked to admit her.    1. Sepsis.  - etiology undetermined.  -CT abdomen pelvis with contrast is pending.  -Chest x-ray shows no focal infiltrate.  -Urinalysis is pending.  -Continue IV fluid support and trend lactic acid.  -Check procalcitonin.    2.  Hyponatremia and hypokalemia.  -Monitor and replace.    Spoke to the son who is POA.  Continue present measures to include IV fluids and antibiotic coverage.  No ICU or pressors.    Called by Dr Lai that per radiology CT scan showed    finding of diverticulitis with likely surrounding contained abscess. Will consult CRS.    Diet:   NPO.  DVT Prophylaxis: Pneumatic Compression Devices  Ward Catheter: Not present  Lines: None     Cardiac Monitoring: None  Code Status:   DNR/DNI.    Clinically Significant Risk Factors Present on Admission        # Hypokalemia: Lowest K = 3.2 mmol/L in last 2 days, will replace as needed         # Drug Induced Platelet Defect: home medication list includes an antiplatelet medication   # Hypertension: Noted on problem list               Disposition Plan           Amado Justin MD  Hospitalist Service  [facility]  Securely message with [employer] (more info)  Text page via [employer]      ______________________________________________________________________    Chief Complaint     N/V.    History is obtained from the patient and emergency department physician    History of Present Illness   Bhakti Marrero is a 89 year old female who has a hx of dementia, breast ca, HTN, HLP who presents with N/V. She has been throwing up since lunch time. In ED noted to have a fever to 103.  The patient is a poor historian but, per her son she has had UTIs in the past.  She is noted to be febrile and tachycardic here.  Her white cell count was elevated 11.8.  COVID test was negative.  Her lactic acid was 3.5.  She was given 1 L of crystalloids.  Placed on broad-spectrum antibiotic coverage.  I discussed her care with , and I am asked to admit her.      Past Medical History    Past Medical History:   Diagnosis Date    Bilateral malignant neoplasm of breast in female (H) 2/16/2021    Chest wall pain 2/21/2021    Hypertension     Created by [employer] updated for latest IMO load     Low bone mass 11/9/2016    Started on alendronate on 11/2/15     Right lower lobe pulmonary nodule 2/11/2021    CT scan at [facility] on 2/2021. Recommendation to recheck in 3 months.  I spoke with daughter,Cuca, on  5/3/2021.  Patient is now in assisted living, memory care.  Family does  not wish to follow-up on this small lung nodule based on patient's age and  declining health.        Past Surgical History   Past Surgical History:   Procedure Laterality Date    MASTECTOMY         Prior to Admission Medications   Prior to Admission Medications   Prescriptions Last Dose Informant Patient Reported? Taking?   aspirin (ASA) 81 MG EC tablet   Yes No   Sig: Take 81 mg by mouth every other day    atorvastatin (LIPITOR) 20 MG tablet Not Taking  No No   Sig: [ATORVASTATIN (LIPITOR) 20 MG TABLET] Take 1 tablet (20 mg total) by mouth daily.   Patient not taking: Reported on 7/26/2023   cholecalciferol, vitamin D3,  50 mcg (2,000 unit) Tab 7/26/2023  No Yes   Sig: [CHOLECALCIFEROL, VITAMIN D3, 50 MCG (2,000 UNIT) TAB] 1 p.o. daily   hydrochlorothiazide (HYDRODIURIL) 25 MG tablet 7/26/2023  No Yes   Sig: [HYDROCHLOROTHIAZIDE (HYDRODIURIL) 25 MG TABLET] TAKE 1 TABLET BY MOUTH EVERY DAY   Patient taking differently: Take 12.5 mg by mouth daily [HYDROCHLOROTHIAZIDE (HYDRODIURIL) 12.5 MG TABLET] TAKE 1 TABLET BY MOUTH EVERY DAY   traZODone (DESYREL) 50 MG tablet   Yes Yes      Facility-Administered Medications: None        Review of Systems    Review of systems not obtained due to patient factors - confusion    Social History   I have reviewed this patient's social history and updated it with pertinent information if needed.  Social History     Tobacco Use    Smoking status: Never    Smokeless tobacco: Never   Substance Use Topics    Alcohol use: Yes     Alcohol/week: 6.0 - 7.0 standard drinks of alcohol    Drug use: No       Family History   I have reviewed this patient's family history and updated it with pertinent information if needed.  Family History   Problem Relation Age of Onset    Cerebrovascular Disease Father        Allergies   No Known Allergies     Physical Exam   Vital Signs: Temp: (!) 103.1  F (39.5  C) Temp src: Oral BP: 111/55 Pulse: (!) 125     SpO2: 97 % O2 Device: None (Room air)    Weight: 100 lbs 0 oz    Gen - Alert, awake, oriented to self in NAD.  Lungs - CTA B.  Heart - tachycardic, S1+S2 nml, no m/g/r.  Abd - soft, + ttp LLQ, mild distention, + BS.  Ext - no edema.    Medical Decision Making       80 MINUTES SPENT BY ME on the date of service doing chart review, history, exam, documentation & further activities per the note.      Data     I have personally reviewed the following data over the past 24 hrs:    11.8 (H)  \   10.7 (L)   / 179     131 (L) 91 (L) 12.0 /  118 (H)   3.2 (L) 25 0.84 \       ALT: 49 AST: 35 AP: 64 TBILI: 0.7   ALB: 4.2 TOT PROTEIN: 7.4 LIPASE: N/A       Trop: 13 BNP: 245       TSH:  4.49 (H) T4: 1.08 A1C: N/A       Procal: N/A CRP: N/A Lactic Acid: 3.5 (H)         Imaging results reviewed over the past 24 hrs:   No results found for this or any previous visit (from the past 24 hour(s)).

## 2023-07-27 NOTE — PLAN OF CARE
Goal Outcome Evaluation:    Pertinent assessments: Patient brought from ED around 0230. Alert, orientedx1 to self only. Otherwise, confused. At times yelling out for help. Crying, saying she is tired from everything. Denies pain or SOB. Denies dizziness/lightheadedness. LS clear/dim on RA. BP soft 80s-90s/40s. MD aware. Lactic BPA triggered, and Lactic ordered. Lactic 2.8. MD notified and ordered 500ml bolus. Bolus was delayed d/t loss of IV and difficulty placing one. Patient has purewick, but asked to use the BR. Voided in a bedpan. Did not get this shift. Per ED report patient was unable to sit up.

## 2023-07-27 NOTE — ED PROVIDER NOTES
History     Chief Complaint:  Nausea & Vomiting     The history is provided by the nursing home (per nurse). History limited by: patient is dementia at baseline.      Bhakti Marrero is a 89 year old female with history of dementia, breast cancer, sepsis, hypertension, and hyperlipidemia who presents to the ED via EMS for evaluation of nausea and vomiting. Per the nurse the patient came from a senior living memory care unit where she lives with her . Nurse says that patient has been nauseous and vomiting since lunch time per the staff. Nurse says patient has felt feverish the past few days but the facility denied fever, patient has a fever of 103 0.1  F in the ED upon arrival. Nurse said she had loose stools, but not watery. Nurse notes some potential bed sores to the groin area. Patient denies any pain.     Review of Systems   Constitutional:  Positive for fever.   Respiratory:  Negative for cough and shortness of breath.    Cardiovascular:  Negative for chest pain.   Gastrointestinal:  Positive for nausea and vomiting. Negative for diarrhea.   Genitourinary:  Negative for dysuria and hematuria.   Musculoskeletal:  Negative for back pain and neck pain.   Neurological:  Negative for dizziness and headaches.   All other systems reviewed and are negative.    Independent Historian:   Nurse provides history as noted above.     Review of External Notes:   8/4/22 assisted living visit note    Medications:    Aspirin 81 mg   Hydrochlorothiazide   Alendronate  Memantine  Trazodone     Past Medical History:    Breast cancer   Chest wall pain   Hypertension   Low bone mass  Right lower lobe pulmonary nodule  Cognitive impairment   Hyperlipidemia   Sepsis   UTI    Past Surgical History:    Mastectomy      Physical Exam   Patient Vitals for the past 24 hrs:   BP Temp Temp src Pulse SpO2 Height Weight   07/26/23 2300 111/55 -- -- (!) 125 97 % -- --   07/26/23 2245 -- -- -- -- 96 % -- --   07/26/23 2130 129/66 -- --  (!) 130 92 % -- --   07/26/23 2107 (!) 143/64 -- -- -- 91 % -- --   07/26/23 2100 -- -- -- -- -- 1.524 m (5') 45.4 kg (100 lb)   07/26/23 2051 137/74 (!) 103.1  F (39.5  C) Oral (!) 136 94 % -- --      Physical Exam  Constitutional:       Appearance: Normal appearance.   HENT:      Head: Normocephalic and atraumatic.   Eyes:      Extraocular Movements: Extraocular movements intact.      Conjunctiva/sclera: Conjunctivae normal.   Cardiovascular:      Rate and Rhythm: Tachycardic rate and regular rhythm.   Pulmonary:      Effort: Pulmonary effort is normal. No respiratory distress.      Breath sounds: Clear to auscultation bilaterally.  Abdominal:      General: Abdomen is flat. There is no distension.      Palpations: Abdomen is soft.      Tenderness: There is no abdominal tenderness.   Musculoskeletal:      Cervical back: Normal range of motion. No rigidity.       Right lower leg: No edema.      Left lower leg: No edema.   Skin:     General: Skin is warm and dry.   Neurological:      General: No focal deficit present.      Mental Status: Pleasantly demented.  Psychiatric:         Mood and Affect: Mood normal.         Behavior: Behavior normal.    Emergency Department Course   ECG  See ED course for independent interpretation.     Imaging:  CT Abdomen Pelvis w Contrast    (Results Pending)   XR Chest 2 Views    (Results Pending)      Report per radiology    Laboratory:  Labs Ordered and Resulted from Time of ED Arrival to Time of ED Departure   COMPREHENSIVE METABOLIC PANEL - Abnormal       Result Value    Sodium 131 (*)     Potassium 3.2 (*)     Chloride 91 (*)     Carbon Dioxide (CO2) 25      Anion Gap 15      Urea Nitrogen 12.0      Creatinine 0.84      Calcium 9.5      Glucose 118 (*)     Alkaline Phosphatase 64      AST 35      ALT 49      Protein Total 7.4      Albumin 4.2      Bilirubin Total 0.7      GFR Estimate 64     TSH WITH FREE T4 REFLEX - Abnormal    TSH 4.49 (*)    CBC WITH PLATELETS AND DIFFERENTIAL -  Abnormal    WBC Count 11.8 (*)     RBC Count 3.50 (*)     Hemoglobin 10.7 (*)     Hematocrit 33.5 (*)     MCV 96      MCH 30.6      MCHC 31.9      RDW 13.2      Platelet Count 179      % Neutrophils 83      % Lymphocytes 7      % Monocytes 9      % Eosinophils 0      % Basophils 0      % Immature Granulocytes 1      NRBCs per 100 WBC 0      Absolute Neutrophils 9.8 (*)     Absolute Lymphocytes 0.8      Absolute Monocytes 1.1      Absolute Eosinophils 0.0      Absolute Basophils 0.0      Absolute Immature Granulocytes 0.1      Absolute NRBCs 0.0     LACTIC ACID WHOLE BLOOD - Abnormal    Lactic Acid 3.5 (*)    ISTAT GASES LACTATE VENOUS POCT - Abnormal    Lactic Acid POCT 3.5 (*)     Bicarbonate Venous POCT 27      O2 Sat, Venous POCT 37 (*)     pCO2 Venous POCT 38 (*)     pH Venous POCT 7.45 (*)     pO2 Venous POCT 21 (*)    TROPONIN T, HIGH SENSITIVITY - Normal    Troponin T, High Sensitivity 13     NT PROBNP INPATIENT - Normal    N terminal Pro BNP Inpatient 245     INFLUENZA A/B, RSV, & SARS-COV2 PCR - Normal    Influenza A PCR Negative      Influenza B PCR Negative      RSV PCR Negative      SARS CoV2 PCR Negative     T4 FREE - Normal    Free T4 1.08     ROUTINE UA WITH MICROSCOPIC REFLEX TO CULTURE   LACTIC ACID WHOLE BLOOD   BLOOD CULTURE   BLOOD CULTURE      Emergency Department Course & Assessments:     Interventions:  Medications   lactated ringers BOLUS 1,000 mL (1,000 mLs Intravenous $New Bag 7/26/23 2314)   vancomycin (VANCOCIN) 1000 mg in dextrose 5% 200 mL PREMIX (1,000 mg Intravenous $New Bag 7/26/23 2314)   acetaminophen (TYLENOL) tablet 975 mg (975 mg Oral $Given 7/26/23 2256)   piperacillin-tazobactam (ZOSYN) intermittent infusion 3.375 g (0 g Intravenous Stopped 7/26/23 2318)      Independent Interpretation (X-rays, CTs, rhythm strip):  None    Assessments/Consultations/Discussion of Management or Tests:  ED Course as of 07/27/23 0040 Wed Jul 26, 2023 2114 EKG 12-lead, tracing only  Sinus  tachycardia.  Rate of 134.  Normal MD and QRS.  Normal QTc.  No acute ST elevation or depression.  No prior ECG for comparison.   2200 I obtained history and examined the patient as noted above.    2321 Discussed patient with hospitalist Dr. Justin who accepted the patient to medical bed with telemetry. Signed out to next shift ED physician to peripherally follow-up on UA and imaging.     Social Determinants of Health affecting care:   None    Disposition:  The patient was admitted to the hospital under the care of Dr. Justin. Next shift ED physician to peripherally follow-up on UA and imaging.    Impression & Plan    CMS Diagnoses: The patient has signs of Severe Sepsis        If one the following conditions is present, a 30 mL/kg bolus is recommended as part of the 6 hour bundle (IBW can be used for BMI >30, or document refusal/contraindication):      1.   Initial hypotension  defined as 2 bps < 90 or map < 65 in the 6hrs before or 3hrs after time zero.     2.  Lactate >4.      The patient has signs of Severe Sepsis as evidenced by:    1. 2 SIRS criteria, AND  2. Suspected infection, AND   3. Organ dysfunction: Lactic Acidosis with value >2.0    Time severe sepsis diagnosis confirmed: 2130 07/26/23 as this was the time when Lactate resulted, and the level was > 2.0    3 Hour Severe Sepsis Bundle Completion:    1. Initial Lactic Acid Result:   Recent Labs   Lab Test 07/26/23 2126 07/26/23 2119   LACT 3.5* 3.5*     2. Blood Cultures before Antibiotics: Yes  3. Broad Spectrum Antibiotics Administered:  yes       Anti-infectives (From admission through now)      Start     Dose/Rate Route Frequency Ordered Stop    07/26/23 2215  vancomycin (VANCOCIN) 1000 mg in dextrose 5% 200 mL PREMIX         1,000 mg  200 mL/hr over 1 Hours Intravenous ONCE 07/26/23 2211 07/26/23 2130  piperacillin-tazobactam (ZOSYN) intermittent infusion 3.375 g        Note to Pharmacy: For [facility], [facility] and [facility]: For Zosyn-naive patients, use the  "\"Zosyn initial dose + extended infusion\" order panel.    3.375 g  100 mL/hr over 30 Minutes Intravenous ONCE 238 238            4. Is initial hypotension present?     No (IV fluid bolus NOT required). IV Fluid volume administered: 1L                     Medical Decision Makin-year-old female as described above presents to the emergency department for nausea, vomiting, and fever.  Patient hemodynamically stable at time evaluation.  Febrile at 103.1  F with tachycardia at 136.  Sinus tachycardia likely secondary to a febrile illness and sepsis.  Patient currently has no complaint at this time.  Patient however is demented at baseline.  Nausea and vomiting primary complaint by facility staff.  Broad differential for sepsis of unknown source.  Will obtain CT abdomen pelvis with contrast for evaluation for acute intra-abdominal infection especially given loose stools.  Chest x-ray for evaluation for pneumonia.  Viral swabbing for acute viral infection such as flu and COVID-19 infection.  Initial lactic acid on arrival 3.5.  Broad-spectrum antibiotic Vanco/Zosyn ordered.  IV fluid bolus given lactic acidemia and tachycardia.  1 set cardiac enzymes given initial concern for A-fib/RVR.  Straight cath urinalysis.  Discussed care plan with patient's son who voiced understanding and agreement with plan.  Answered all questions.  Additional work-up and orders as listed in chart.    Please refer to ED course above for details on the patient's treatment course and any changes or updates in care plan beyond my initial evaluation and MDM.    Diagnosis:    ICD-10-CM    1. Sepsis, due to unspecified organism, unspecified whether acute organ dysfunction present (H)  A41.9       2. Nausea and vomiting, unspecified vomiting type  R11.2            Discharge Medications:  New Prescriptions    No medications on file      Scribe Disclosure:  Zully GONZALEZ, am serving as a scribe at 8:58 PM on 2023 to " document services personally performed by Claudy Chavarria DO based on my observations and the provider's statements to me.     7/26/2023   Claudy Chavarria DO Yeh, Ferris, DO  07/26/23 2331       Claudy Chavarria DO  07/27/23 0040

## 2023-07-27 NOTE — CONSULTS
[facility]  Colon and Rectal Surgery Consult Note  Name: Bhakti Marrero    MRN: [id number]  YOB: 1930    Age: 89 year old  Date of admission: 7/26/2023  Primary care provider: Bipin Purdy     Requesting Physician:  Dr. Justin  Reason for consult:  Diverticulitis with abscess           History of Present Illness:   Bhakti Marrero is a 89 year old female with a history of dementia, breast cancer, hypertension, and hyperlipidemia, seen at the request of Dr. Justin, presents with nausea and vomiting.     The patient currently resides at a senior living memory care unit with her . The facility noted that the patient was nauseous and had multiple episodes of emesis which prompted the ER visit. In the ER, the patient was febrile to 103 and tachycardic to 136. Labs were remarkable for sodium 131, potassium 3.2, WBC 11.8, lactic acid 3.5, hgb 10.7. A CT scan of the abdomen pelvis revealed proximal sigmoid diverticulitis with a contained perforation. She was admitted for further management and started on IV Zosyn.     The patient is currently resting in bed. She is a poor historian given history of dementia. She currently denies any pain or nausea. She overall feels okay. She is now afebrile with some softer blood pressures. Her last recorded blood pressure was 93/45. All other vital signs of normalized. Her WBC has increased to 23.6 and her lactic acid has improved to 1.7.    Colonoscopy History:  Unsure of last colonoscopy    Surgical History: Mastectomy            Past Medical History:     Past Medical History:   Diagnosis Date     Bilateral malignant neoplasm of breast in female (H) 2/16/2021     Chest wall pain 2/21/2021     Hypertension     Created by [employer]version [employer] updated for latest IMO load      Low bone mass 11/9/2016    Started on alendronate on 11/2/15      Right lower lobe pulmonary nodule 2/11/2021    CT scan at [facility] on 2/2021.  Recommendation to recheck in 3 months.  I spoke with daughter,Cuca, on  5/3/2021.  Patient is now in assisted living, memory care.  Family does  not wish to follow-up on this small lung nodule based on patient's age and  declining health.              Past Surgical History:     Past Surgical History:   Procedure Laterality Date     MASTECTOMY                 Social History:     Social History     Tobacco Use     Smoking status: Never     Smokeless tobacco: Never   Substance Use Topics     Alcohol use: Yes     Alcohol/week: 6.0 - 7.0 standard drinks of alcohol             Family History:     Family History   Problem Relation Age of Onset     Cerebrovascular Disease Father              Allergies:   No Known Allergies          Medications:       piperacillin-tazobactam  2.25 g Intravenous Q6H     sodium chloride (PF)  3 mL Intracatheter Q8H             Review of Systems:   A comprehensive greater than 10 system review of systems was carried out.  Pertinent positives and negatives are noted above.  Otherwise negative for contributory info.            Physical Exam:     Blood pressure 93/45, pulse 89, temperature 97.9  F (36.6  C), temperature source Oral, resp. rate 16, height 1.524 m (5'), weight 49.6 kg (109 lb 5.6 oz), SpO2 98 %.  No intake or output data in the 24 hours ending 07/27/23 1026  EXAM:  GEN: Awake and alert, oriented to self, appears stated age  PULM: Non-labored breathing with normal respiratory effort  CVS: reg rate and rhythm, no peripheral edema  ABD: Soft, non- tender, non- distended. No rebound or guarding   RECTAL: Rectal exam was deferred  NEURO: CN II-XII grossly intact  MSK: extremeties with no clubbing, cyanosis or edema; able to ambulate  PSYCH: responsive, alert, cooperative; appropriate mood and affect  EXT/SKIN: inspection reveals no rashes, lesions or ulcers, normal coloring         Data Reviewed:     Results for orders placed or performed during the hospital encounter of 07/26/23   CT  Abdomen Pelvis w Contrast     Value    Radiologist flags Perforated viscus (AA)    Narrative    EXAM: CT ABDOMEN PELVIS W CONTRAST  LOCATION: [facility]  DATE: 7/27/2023    INDICATION: nausea vomiting, fever  COMPARISON: None.  TECHNIQUE: CT scan of the abdomen and pelvis was performed following injection of IV contrast. Multiplanar reformats were obtained. Dose reduction techniques were used.  CONTRAST: 50 mL Isovue 370    FINDINGS:   LOWER CHEST: Tortuous, calcified descending thoracic aorta.    HEPATOBILIARY: Distended gallbladder. No calcified gallstones. Normal liver.    PANCREAS: Normal.    SPLEEN: Normal.    ADRENAL GLANDS: Normal.    KIDNEYS/BLADDER: Benign right renal cysts requiring no follow-up. No hydronephrosis. Bladder is normal.    BOWEL: Normal appendix. Normal caliber small bowel. Colonic diverticulosis. Wall thickening and pericolonic edema involving the redundant proximal sigmoid colon. There is a 4 x 2 cm extraluminal collection which is predominantly gasless with scant fluid   along the anterior wall of the proximal sigmoid colon on axial image 133. No ivette pneumoperitoneum.    LYMPH NODES: Normal.    VASCULATURE: Aortoiliac atherosclerotic calcification without aneurysm.    PELVIC ORGANS: Calcified uterine fibroids.    MUSCULOSKELETAL: Osteopenia with degenerative disc change of the visualized spine.      Impression    IMPRESSION:   1.  Findings consistent with proximal sigmoid diverticulitis complicated by contained perforation as above.      [Critical Result: Perforated viscus]    Finding was identified on 7/27/2023 12:14 AM CDT.     1.  Dr. Chavarria was contacted by me on 7/27/2023 12:22 AM CDT and verbalized understanding of the critical result.    XR Chest 2 Views    Narrative    EXAM: XR CHEST 2 VIEWS  LOCATION: [facility]  DATE: 7/26/2023    INDICATION: sepsis  COMPARISON: None.      Impression    IMPRESSION: No focal pulmonary consolidation or  effusion. Normal heart size without pulmonary vascular congestion. No pneumothorax. Degenerative changes of the thoracic spine.       Recent Labs   Lab 07/27/23  0555 07/26/23  2115   WBC 23.6* 11.8*   HGB 8.7* 10.7*   HCT 26.3* 33.5*   MCV 96 96    179     Recent Labs   Lab 07/27/23  0555 07/26/23  2115   * 131*   POTASSIUM 3.2* 3.2*   CHLORIDE 95* 91*   CO2 24 25   ANIONGAP 11 15   * 118*   BUN 11.8 12.0   CR 0.99* 0.84   GFRESTIMATED 53* 64   RAGINI 8.2 9.5   PROTTOTAL  --  7.4   ALBUMIN  --  4.2   BILITOTAL  --  0.7   ALKPHOS  --  64   AST  --  35   ALT  --  49     No results for input(s): INR in the last 168 hours.  Recent Labs   Lab 07/27/23  0555 07/27/23  0253 07/27/23  0033   LACT 1.7 2.8* 2.5*     Recent Labs   Lab 07/27/23  0624   COLOR Yellow   APPEARANCE Clear   URINEGLC Negative   URINEBILI Negative   URINEKETONE Negative   SG 1.005   UBLD Small*   URINEPH 6.0   PROTEIN 10*   NITRITE Negative   LEUKEST Small*   RBCU 6*   WBCU 3         Assessment and Plan:   Bhakti Marrero is a 89 year old female with a history of dementia, breast cancer, hypertension, and hyperlipidemia, seen at the request of Dr. Justin, presents with nausea and vomiting. A CT scan of the abdomen pelvis revealed proximal sigmoid diverticulitis with a contained perforation. WBC 23.6. She is now afebrile with softer blood pressures. Otherwise she is hemodynamically stable. No emergent surgery is indicated. Recommend conservative management including bowel rest and IV antibiotics. Briefly discussed the possible need for surgery vs. IR drain placement if she does not improve with conservative management although unsure of the depth of her understanding given her dementia. Her son was not in the room at the time of the visit, but the patient is unsure if she would want any surgery. Will reach out to the son regarding further intervention and care. Recommend she continue as NPO until further discussion. We will continue  to follow.     Plan:  1. Admit to hospitalist  2. Surgery: No emergent surgery indicated  3. Diet: NPO  4. IV Fluids: Per hospitalist  5. Antibiotics:  IV Zosyn  6. Medications: Continue home meds per hospitalist  7. I&O s:  strict I&O s  8. Labs:   - Reviewed  - Ordered: None   9. Imaging:   - Dr. Caceres and myself have personally viewed: CT abd/pelvis  - Ordered:  None  10. Activity: ambulate as tolerated, encourage OOB  11. DVT prophylaxis: SCD s  12. This plan has been discussed with Dr. Caceres    Patient specific identified risk factors considered as part of today s evaluation include: Dementia    Additional history obtained from chart and patient.  Time spent on consultation: 55 minutes, greater than 50 percent of the total encounter time is spent in counseling and/or coordination of care          Sara Covington PA-C  [facility]  Phone:  [phone]

## 2023-07-27 NOTE — PHARMACY-ADMISSION MEDICATION HISTORY
Pharmacist Admission Medication History    Admission medication history is complete. The information provided in this note is only as accurate as the sources available at the time of the update.    Medication reconciliation/reorder completed by provider prior to medication history? No    Information Source(s): Facility ([facility]/) medication [place]/MAR [place] [phone][place]    Pertinent Information: none    Changes made to PTA medication list:  Added: Calmoseptine  Deleted: ASA, atorvastatin  Changed: hydrochlorothiazide dose, trazodone dose/sig       Allergies reviewed with patient and updates made in EHR: unable to assess    Medication History Completed By:   Malini Goncalves, PharmD, BCPS      Prior to Admission medications    Medication Sig Last Dose Taking? Auth Provider Long Term End Date   cholecalciferol, vitamin D3, 50 mcg (2,000 unit) Tab [CHOLECALCIFEROL, VITAMIN D3, 50 MCG (2,000 UNIT) TAB] 1 p.o. daily 7/26/2023 at 0800 Yes Rajiv Moe MD     hydrochlorothiazide (MICROZIDE) 12.5 MG capsule Take 12.5 mg by mouth daily 7/26/2023 at 2000 Yes Unknown, Entered By History Yes    menthol-zinc oxide (CALMOSEPTINE) 0.44-20.6 % OINT ointment Apply topically 2 times daily Until reddened areas resolved 7/26/2023 at 0800 Yes Unknown, Entered By History     traZODone (DESYREL) 50 MG tablet Take 25 mg by mouth At Bedtime 7/26/2023 at 2000 Yes Reported, Patient Yes

## 2023-07-27 NOTE — ED NOTES
Notified Dr. Lai of soft systolic's in 80's. Ordered an additional liter of IV fluids. Admission provider, Dr. Justin saw patient and per son and patient- they do not want any aggressive treatment or to be admitted in the ICU.

## 2023-07-27 NOTE — PLAN OF CARE
Disoriented to time, place and situation, soft BP. On RA. Assist of 2 with a addi steady. K replaced per protocol. IVF infusing @@100 ml/hr. NPO, okay for ice chips and meds. BG check 76, MD made aware.  Major Shift Events none  Treatment Plan: ..IVF, Zosyn

## 2023-07-27 NOTE — ED NOTES
[facility]  ED Nurse Handoff Report    ED Chief complaint: Nausea & Vomiting  . ED Diagnosis:   Final diagnoses:   Sepsis, due to unspecified organism, unspecified whether acute organ dysfunction present (H)   Nausea and vomiting, unspecified vomiting type       Allergies: No Known Allergies    Code Status: DNR / DNI    Activity level - Baseline/Home:  standby and walker.  Activity Level - Current:   assist of 2.   Lift room needed: No.   Bariatric: No   Needed: No   Isolation: No.   Infection: Not Applicable.     Respiratory status: Room air    Vital Signs (within 30 minutes):   Vitals:    07/26/23 2245 07/26/23 2300 07/26/23 2305 07/26/23 2320   BP:  111/55     Pulse:  (!) 125     Temp:       TempSrc:       SpO2: 96% 97% 99% 95%   Weight:       Height:           Cardiac Rhythm:  ,      Pain level:    Patient confused: Yes. Comes from Memory care facility with . Alert to self and place only  Patient Falls Risk: bed/chair alarm on, nonskid shoes/slippers when out of bed, arm band in place, patient and family education, assistive device/personal items within reach, and activity supervised.   Elimination Status: Has voided     Patient Report - Initial Complaint: Felt feverish for past 3 days. Today after lunch has had N/V. Has had 1 episode of emesis since admission.  Focused Assessment:   The history is provided by the nursing home (per nurse). History limited by: patient is dementia at baseline.      Bhakti Marrero is a 89 year old female with history of dementia, breast cancer, sepsis, hypertension, and hyperlipidemia who presents to the ED via EMS for evaluation of nausea and vomiting. Per the nurse the patient came from a senior living memory care unit where she lives with her . Nurse says that patient has been nauseous and vomiting since lunch time per the staff. Nurse says patient has felt feverish the past few days but the facility denied fever, patient has a  fever of 103 0.1  F in the ED upon arrival. Nurse said she had loose stools, but not watery. Nurse notes some potential bed sores to the groin area. Patient denies any pain.      Review of Systems   Constitutional:  Positive for fever.   Respiratory:  Negative for cough and shortness of breath.    Cardiovascular:  Negative for chest pain.   Gastrointestinal:  Positive for nausea and vomiting. Negative for diarrhea.   Genitourinary:  Negative for dysuria and hematuria.   Musculoskeletal:  Negative for back pain and neck pain.   Neurological:  Negative for dizziness and headaches.   All other systems reviewed and are negative.     Independent Historian:   Nurse provides history as noted above.      Review of External Notes:   8/4/22 assisted living visit note     Medications:    Aspirin 81 mg   Hydrochlorothiazide   Alendronate  Memantine  Trazodone      Past Medical History:    Breast cancer   Chest wall pain   Hypertension   Low bone mass  Right lower lobe pulmonary nodule  Cognitive impairment   Hyperlipidemia   Sepsis   UTI     Past Surgical History:    Mastectomy       Abnormal Results:   Labs Ordered and Resulted from Time of ED Arrival to Time of ED Departure   COMPREHENSIVE METABOLIC PANEL - Abnormal       Result Value    Sodium 131 (*)     Potassium 3.2 (*)     Chloride 91 (*)     Carbon Dioxide (CO2) 25      Anion Gap 15      Urea Nitrogen 12.0      Creatinine 0.84      Calcium 9.5      Glucose 118 (*)     Alkaline Phosphatase 64      AST 35      ALT 49      Protein Total 7.4      Albumin 4.2      Bilirubin Total 0.7      GFR Estimate 64     TSH WITH FREE T4 REFLEX - Abnormal    TSH 4.49 (*)    CBC WITH PLATELETS AND DIFFERENTIAL - Abnormal    WBC Count 11.8 (*)     RBC Count 3.50 (*)     Hemoglobin 10.7 (*)     Hematocrit 33.5 (*)     MCV 96      MCH 30.6      MCHC 31.9      RDW 13.2      Platelet Count 179      % Neutrophils 83      % Lymphocytes 7      % Monocytes 9      % Eosinophils 0      % Basophils 0       % Immature Granulocytes 1      NRBCs per 100 WBC 0      Absolute Neutrophils 9.8 (*)     Absolute Lymphocytes 0.8      Absolute Monocytes 1.1      Absolute Eosinophils 0.0      Absolute Basophils 0.0      Absolute Immature Granulocytes 0.1      Absolute NRBCs 0.0     LACTIC ACID WHOLE BLOOD - Abnormal    Lactic Acid 3.5 (*)    ISTAT GASES LACTATE VENOUS POCT - Abnormal    Lactic Acid POCT 3.5 (*)     Bicarbonate Venous POCT 27      O2 Sat, Venous POCT 37 (*)     pCO2 Venous POCT 38 (*)     pH Venous POCT 7.45 (*)     pO2 Venous POCT 21 (*)    TROPONIN T, HIGH SENSITIVITY - Normal    Troponin T, High Sensitivity 13     NT PROBNP INPATIENT - Normal    N terminal Pro BNP Inpatient 245     INFLUENZA A/B, RSV, & SARS-COV2 PCR - Normal    Influenza A PCR Negative      Influenza B PCR Negative      RSV PCR Negative      SARS CoV2 PCR Negative     T4 FREE - Normal    Free T4 1.08     ROUTINE UA WITH MICROSCOPIC REFLEX TO CULTURE   LACTIC ACID WHOLE BLOOD   BLOOD CULTURE   BLOOD CULTURE        XR Chest 2 Views   Final Result   IMPRESSION: No focal pulmonary consolidation or effusion. Normal heart size without pulmonary vascular congestion. No pneumothorax. Degenerative changes of the thoracic spine.      CT Abdomen Pelvis w Contrast    (Results Pending)       Treatments provided: See MAR  Family Comments: Son at bedside  OBS brochure/video discussed/provided to patient:  N/A  ED Medications:   Medications   acetaminophen (TYLENOL) tablet 975 mg (975 mg Oral $Given 7/26/23 2256)   lactated ringers BOLUS 1,000 mL (1,000 mLs Intravenous $New Bag 7/26/23 2314)   piperacillin-tazobactam (ZOSYN) intermittent infusion 3.375 g (0 g Intravenous Stopped 7/26/23 2318)   vancomycin (VANCOCIN) 1000 mg in dextrose 5% 200 mL PREMIX (1,000 mg Intravenous $New Bag 7/26/23 2314)   iopamidol (ISOVUE-370) solution 500 mL (50 mLs Intravenous $Given 7/26/23 8118)   sodium chloride (PF) 0.9% PF flush 100 mL (60 mLs Intravenous $Given 7/26/23  3273)       Drips infusing:  No  For the majority of the shift this patient was Green  Interventions performed were     Sepsis treatment initiated: Yes    Per the ED Provider, Time Zero for severe sepsis or septic shock is:      3 Hour Severe Sepsis Bundle Completion:  1. Initial Lactic Acid Result:   Recent Labs   Lab Test 07/26/23 2126 07/26/23 2119   LACT 3.5* 3.5*     2. Blood Cultures before Antibiotics: Yes  3. Broad Spectrum Antibiotics Administered:     Anti-infectives (From now, onward)      None          4. 1000 ml of IV fluids have been given so far      6 Hour Severe Sepsis Bundle Completion:    1. Repeat Lactic Acid Level: Not drawn  2. Patient currently on Vasopressors =  No    Cares/treatment/interventions/medications to be completed following ED care: Monitor nausea and fever.    ED Nurse Name: Meena Daniel RN  12:36 AM     RECEIVING UNIT ED HANDOFF REVIEW    Above ED Nurse Handoff Report was reviewed: Yes  Reviewed by: Zee Castrejon RN on July 27, 2023 at 12:50 AM

## 2023-07-28 NOTE — CONSULTS
Care Management Initial Consult    General Information  Assessment completed with: Children, Caregiver, Cuca Corinna  Type of CM/SW Visit: Initial Assessment    Primary Care Provider verified and updated as needed: Yes   Readmission within the last 30 days:        Reason for Consult: discharge planning  Advance Care Planning: Advance Care Planning Reviewed: present on chart          Communication Assessment  Patient's communication style: spoken language (English or Bilingual)    Hearing Difficulty or Deaf: no   Wear Glasses or Blind: no    Cognitive  Cognitive/Neuro/Behavioral: .WDL except, orientation  Level of Consciousness: confused  Arousal Level: opens eyes spontaneously  Orientation: disoriented to, place, time, situation  Mood/Behavior: calm  Best Language: 0 - No aphasia  Speech: clear    Living Environment:   People in home: facility resident     Current living Arrangements: assisted living (Memory Care)  Name of Facility: [facility]   Able to return to prior arrangements: If facility is able to meet pt's current level of needs.       Family/Social Support:  Care provided by:  (Facility staff)  Provides care for: no one  Marital Status:   , Children, Facility resident(s)/Staff          Description of Support System: Supportive, Involved    Support Assessment: Adequate family and caregiver support, Adequate social supports    Current Resources:   Patient receiving home care services: No     Community Resources: None  Equipment currently used at home: walker, rolling, shower chair  Supplies currently used at home: Incontinence Supplies, Gloves, Chux    Employment/Financial:  Employment Status: retired        Financial Concerns: No concerns identified   Referral to Financial Worker: No       Does the patient's insurance plan have a 3 day qualifying hospital stay waiver?  Yes   Will the waiver be used for post-acute placement? No    Lifestyle & Psychosocial Needs:  Social Determinants  of Health     Tobacco Use: Low Risk  (8/4/2022)    Patient History     Smoking Tobacco Use: Never     Smokeless Tobacco Use: Never     Passive Exposure: Not on file   Alcohol Use: Not on file   Financial Resource Strain: Not on file   Food Insecurity: Not on file   Transportation Needs: Not on file   Physical Activity: Not on file   Stress: Not on file   Social Connections: Not on file   Intimate Partner Violence: Not on file   Depression: Not at risk (10/26/2020)    PHQ-2     PHQ-2 Score: 0   Housing Stability: Not on file       Functional Status:  Prior to admission patient needed assistance:   Dependent ADLs:: Ambulation-walker, Bathing, Dressing, Grooming, Incontinence, Positioning, Transfers, Toileting  Dependent IADLs:: Cleaning, Cooking, Laundry, Shopping, Meal Preparation, Medication Management, Money Management, Transportation, Incontinence  Assesssment of Functional Status: Not at baseline with mobility    Mental Health Status:  Mental Health Status: No Current Concerns       Chemical Dependency Status:  Chemical Dependency Status: No Current Concerns             Values/Beliefs:  Spiritual, Cultural Beliefs, Orthodoxy Practices, Values that affect care:            Values/Beliefs Comment: Caodaism    Additional Information:  Pt is confused and unable to complete consult, so Sw spoke with the pt's dtr Cuca.  Cuca confirmed that the pt is from [facility].  Pt is in a studio apartment with her spouse.  Pt is confused at baseline.  She was receiving their level 2 services, but recently was moved to receiving level 3 services due to increased care needs.  Pt is now using a walker with assistance from one staff member to ambulate.  The pt gets assistance with all ADLs/IADLs.  Cuca said that they took her out of the facility for an outing in May, but it was difficult to get her in and out of the car then.  Cuca would like to consider transportation closer to discharge once they have a better idea  on how the pt is doing.  Jeff explained to Cuca that the pt is needing more physical assistance now than she did at baseline.  Cuca anticipates that the pt will need to increase her services again when she returns to the facility.  Cuca is agreeable if they need to increase services.  She also told Sw that [facility] had recently talked with them about potential hospice needs.  Cuca said that if the pt does need hospice, they would like to use [facility].  [facility].  Cuca is unsure if hospice should be discussed again at this time.  Cuca gave Sw permission to call the facility and talk with them about the pt's return, Corinna is the head nurse P: [phone].  Sw called and left Corinna a vm, requesting a call back.    Sw will continue with discharge planning and will be available as needed until discharge.    TAVO Gutierrez, [facility]  Inpatient Care Coordination  [facility]  [phone]    ADDENDUM 4725:  Jeff received a call from SHELLY Weinstein at [facility] P: [phone] F: [phone], updating Sw on the pt's status at the [facility]/.    Patient receives services as follows: bathing, dressing, grooming, med management, and meals.  Pt is able to walk independently with her walker, eat independently, and take herself to the bathroom.  Staff assist the pt with toileting if she needs her brief changed or other assistance.    [facility] contact (name/number): SHELLY Weinstein P: [phone]  Fax #: [phone]  Barriers to pt returning: Corinna will come and do an onsite assessment to determine level of care needs upon return to the facility.  Once a discharge date is more clear, update Corinna to arrange for the onsite assessment.  Baseline mobility: Independently with her walker, but slow  Time of preferred return: Facility cannot do evening, night, or weekend returns.  They prefer the pt return by noon on day of discharge.  New meds/prescriptions/Pharmacy: [facility]  Transportation: TBD based on pt's physical status at discharge.

## 2023-07-28 NOTE — CARE PLAN
Per infectious disease lab, they could not detect any common pathogens in blood cultures. So, identification will take 1-2 days.

## 2023-07-28 NOTE — PLAN OF CARE
Goal Outcome Evaluation:Pertinent assessments: Alert to self ---  tolerating sips of clear liquids denies nausea ---up with addi steady and 1 assist  taken to chair ----  continent of bowel and bladder when taken  q 3 -4 hours ---  Major Shift Event   occasionally restless    Treatment Plan  increase diet -- monitor  Bedside Nurse: Susan Bergman RN

## 2023-07-28 NOTE — PLAN OF CARE
Goal Outcome Evaluation:    [facility] [id number]. Alert, orientedx1 to self only. Otherwise, confused. At times emotional and anxious. Denies pain or SOB. Denies abdominal pain/discomfort. Around 4AM patient had small emesis, and c/o nausea. Prn Zofran given with relief. LS clear/dim on RA. Vitals stable. Tele: SR with PACs, HR 80s. Patient had 3 loose stools. Voided in the BR. Assist of 1-2 with sarasteady.

## 2023-07-28 NOTE — PROGRESS NOTES
[facility]    Hospitalist Progress Note    Date of Service (when I saw the patient): 07/28/2023  Provider:  Bhupinder Dutton MD   Text Page  7am - 6PM       Assessment & Plan   Bhakti Marrero is a 89 year old female admitted on 7/26/2023. She has a hx of dementia, breast ca, HTN, HLP who presents with N/V. She has been throwing up since lunch time. In ED noted to have a fever to 103.  The patient is a poor historian but, per her son she has had UTIs in the past.  She is noted to be febrile and tachycardic here.  Her white cell count was elevated 11.8.  COVID test was negative.  Her lactic acid was 3.5.  She was given 1 L of crystalloids.  Placed on broad-spectrum antibiotic coverage.  I discussed her care with , and I am asked to admit her.     1. Acute colonic diverticulitis/Sepsis/possible microperforation with abscess formation.  -CT abdomen pelvis findings below.  -Chest x-ray shows no focal infiltrate.  -Zosyn IV  -Continue IV fluid support    -CRS consult and involvement appreciated.   -APAP for pain and fever     2.  Sepsis 2/2 to above. GNR BACTERIEMIA  Leukocytosis, elevated LA, tachycardia, DANIEL.   White count normalized.  Renal function back to baseline.  Follow-up culture result, suspect E. coli most likely.    3  Hyponatremia /hypokalemia/hypochloremia  -Sodium improved, potassium and chloride corrected.  Monitor and replace per protocol.     4. DANIEL on CKD stage II.  Resolved.  - Suspect 2/2 sepsis and fluid depletion.  - Hydration, ABX, no NSAID or nephrotoxic.     5. Essential hypertension.  - hydrochlorothiazide on hold     6. Worsening of chronic anemia in the context os sepsis.   Close follow up     Diet:   NPO.     Ward Catheter: Not present  Lines: None     Cardiac Monitoring: None  Code Status:   DNR/DNI.     Clinically Significant Risk Factors Present on Admission         # Hypokalemia: Lowest K = 3.2 mmol/L in last 2 days, will replace as needed         # Drug  Induced Platelet Defect: home medication list includes an antiplatelet medication   # Hypertension: Noted on problem list         DVT Prophylaxis: Pneumatic Compression Devices  Code Status: No CPR- Do NOT Intubate    Disposition: Expected discharge once resoled and po tolerance.    Interval History   She feels better, no fever/pain or distension at the     -Data reviewed today: I reviewed all new labs and imaging results over the last 24 hours.     Physical Exam   Temp: 98.3  F (36.8  C) Temp src: Axillary BP: 109/56 Pulse: 80   Resp: 16 SpO2: 97 % O2 Device: None (Room air)    Vitals:    07/26/23 2100 07/27/23 0210   Weight: 45.4 kg (100 lb) 49.6 kg (109 lb 5.6 oz)     Vital Signs with Ranges  Temp:  [98.3  F (36.8  C)-99.6  F (37.6  C)] 98.3  F (36.8  C)  Pulse:  [80-95] 80  Resp:  [16-20] 16  BP: ()/(49-63) 109/56  SpO2:  [93 %-99 %] 97 %  I/O last 3 completed shifts:  In: 2100 [I.V.:2100]  Out: -     GEN:  Alert, oriented x 3, appears comfortable, NAD.  HEENT:  Normocephalic/atraumatic, no scleral icterus, no nasal discharge, mouth moist.  CV:  Regular rate and rhythm, no murmur or JVD.  S1 + S2 noted, no S3 or S4.  LUNGS:  Clear to auscultation bilaterally without rales/rhonchi/wheezing/retractions.  Symmetric chest rise on inhalation noted.  ABD:  Active bowel sounds, soft, non-tender/non-distended.  No rebound/guarding/rigidity.  EXT:  No edema or cyanosis.  No joint synovitis noted.  SKIN:  Dry to touch, no exanthems noted in the visualized areas.       Medications    dextrose 5% and 0.45% NaCl + KCl 20 mEq/L 75 mL/hr at 07/28/23 0634    sodium chloride Stopped (07/27/23 1739)      piperacillin-tazobactam  2.25 g Intravenous Q6H    sodium chloride (PF)  3 mL Intracatheter Q8H       Data   Recent Labs   Lab 07/28/23  0652 07/28/23  0448 07/28/23  0001 07/27/23 1957 07/27/23  1755 07/27/23  1742 07/27/23  0555 07/26/23 2115   WBC 7.7  --   --   --   --   --  23.6* 11.8*   HGB 8.1*  --   --   --   --    --  8.7* 10.7*   MCV 98  --   --   --   --   --  96 96   *  --   --   --   --   --  153 179   *  --   --   --   --   --  130* 131*   POTASSIUM 3.6  --   --   --  3.5  --  3.2* 3.2*   CHLORIDE 102  --   --   --   --   --  95* 91*   CO2 22  --   --   --   --   --  24 25   BUN 10.9  --   --   --   --   --  11.8 12.0   CR 0.93  --   --   --   --   --  0.99* 0.84   ANIONGAP 9  --   --   --   --   --  11 15   RAGINI 7.9*  --   --   --   --   --  8.2 9.5   * 115* 106*   < >  --    < > 137* 118*   ALBUMIN  --   --   --   --   --   --   --  4.2   PROTTOTAL  --   --   --   --   --   --   --  7.4   BILITOTAL  --   --   --   --   --   --   --  0.7   ALKPHOS  --   --   --   --   --   --   --  64   ALT  --   --   --   --   --   --   --  49   AST  --   --   --   --   --   --   --  35    < > = values in this interval not displayed.         No results found for this or any previous visit (from the past 24 hour(s)).        Securely message with the Vocera Web Console (learn more here)  Text page via [employer] Paging/Directory        Disclaimer: This note consists of symbols derived from keyboarding, dictation and/or voice recognition software. As a result, there may be errors in the script that have gone undetected. Please consider this when interpreting information found in this chart.

## 2023-07-28 NOTE — PROGRESS NOTES
Colon & Rectal Surgery Progress Note             Interval History:     Feels better. Denies abdominal pain but poor historian. Unclear if she has had bowel function                  Medications:   I have reviewed this patient's current medications               Physical Exam:   Blood pressure 99/49, pulse 84, temperature 98.8  F (37.1  C), temperature source Oral, resp. rate 16, height 1.524 m (5'), weight 49.6 kg (109 lb 5.6 oz), SpO2 96 %.    Intake/Output Summary (Last 24 hours) at 7/28/2023 0925  Last data filed at 7/27/2023 1856  Gross per 24 hour   Intake 2100 ml   Output --   Net 2100 ml     GEN:  alert but limited cognition  ABD:  soft, non-tender         Data:        Lab Results   Component Value Date     07/28/2023    Lab Results   Component Value Date    CHLORIDE 102 07/28/2023    CHLORIDE 95 11/02/2021    Lab Results   Component Value Date    BUN 10.9 07/28/2023    BUN 11 11/02/2021      Lab Results   Component Value Date    POTASSIUM 3.6 07/28/2023    POTASSIUM 3.2 11/02/2021    Lab Results   Component Value Date    CO2 22 07/28/2023    CO2 30 11/02/2021    Lab Results   Component Value Date    CR 0.93 07/28/2023    CR 0.99 07/27/2023        Lab Results   Component Value Date    HGB 8.1 (L) 07/28/2023    HGB 8.7 (L) 07/27/2023     Lab Results   Component Value Date     (L) 07/28/2023     07/27/2023     Lab Results   Component Value Date    WBC 7.7 07/28/2023    WBC 23.6 (H) 07/27/2023            Assessment and Plan:     WBC normalized on ABX  Stable anemia unclear source? No signs of bleeding.  No plan for surgery  Continue IV abx.   Advance diet with aspiration precautions.  Call if questions or concerns       Courtney Rodriguez MD  [facility]  Office Phone # [phone]

## 2023-07-29 NOTE — PLAN OF CARE
Goal Outcome Evaluation:  Pertinent assessments: Alert to self occasionally restless and confused --- up in room with walker -belt and assist of one---   tolerating bites of low fiber diet denies nausea ---continent of bowel and bladder when on schedule   Major Shift Events increase in diet and activity  Treatment Plan: monitor  Bedside Nurse: Susan Bergman RN

## 2023-07-29 NOTE — PROGRESS NOTES
COLON & RECTAL SURGERY  PROGRESS NOTE    July 29, 2023    SUBJECTIVE: Denies abdominal pain.  Reports hunger this morning.  Tolerating a liquid diet. Having loose stools.     OBJECTIVE:  Temp:  [97.9  F (36.6  C)-98.8  F (37.1  C)] 98.7  F (37.1  C)  Pulse:  [76-99] 82  Resp:  [16-20] 20  BP: (109-139)/(55-81) 117/66  SpO2:  [95 %-98 %] 96 %    Intake/Output Summary (Last 24 hours) at 7/29/2023 0902  Last data filed at 7/28/2023 1900  Gross per 24 hour   Intake 1050 ml   Output --   Net 1050 ml       GENERAL:  Awake, alert, no acute distress  EXTREMITIES: Warm and well perfused, no edema   ABDOMEN:  Soft, non tender, non-distended. No guarding, rigidity, or peritoneal signs.     LABS:  Lab Results   Component Value Date    WBC 7.7 07/28/2023     Lab Results   Component Value Date    HGB 8.1 07/28/2023     Lab Results   Component Value Date    HCT 25.3 07/28/2023     Lab Results   Component Value Date     07/28/2023     Last Basic Metabolic Panel:  Lab Results   Component Value Date     07/28/2023      Lab Results   Component Value Date    POTASSIUM 3.6 07/28/2023    POTASSIUM 3.2 11/02/2021     Lab Results   Component Value Date    CHLORIDE 102 07/28/2023    CHLORIDE 95 11/02/2021     Lab Results   Component Value Date    RAGINI 7.9 07/28/2023     Lab Results   Component Value Date    CO2 22 07/28/2023    CO2 30 11/02/2021     Lab Results   Component Value Date    BUN 10.9 07/28/2023    BUN 11 11/02/2021     Lab Results   Component Value Date    CR 0.93 07/28/2023     Lab Results   Component Value Date     07/28/2023     07/28/2023     11/02/2021       ASSESSMENT/PLAN: Bhakti Marrero is a 89 year old female with a history of dementia, breast cancer, hypertension and hyperlipidemia who was admitted with nausea and emesis.  She was found to have perforated diverticulitis with an associated abscess.  Leukocytosis has improved with management with IV antibiotics.  In discussions with Bhakti  and her family, they would decline surgical intervention if required.    Okay for diet advancement to a low fiber diet as tolerated from a colorectal surgery standpoint.  As the patient or the family is interested in invasive measures for management of her diverticulitis were her clinical status to worsen, colorectal surgery will sign off.  Please call with any questions, concerns or if there is a significant change in the patient's goals of care.     For questions/paging, please contact the CRS office at [phone].    Nora Caceres MD  Colorectal Surgery    [facility]  [address] [address]  T: [phone]  F: [phone]

## 2023-07-29 NOTE — PROGRESS NOTES
[facility]    Hospitalist Progress Note    Date of Service (when I saw the patient): 07/29/2023  Provider:  Bhupinder Dutton MD   Text Page  7am - 6PM       Assessment & Plan   Bhakti Marrero is a 89 year old female admitted on 7/26/2023. She has a hx of dementia, breast ca, HTN, HLP who presents with N/V. She has been throwing up since lunch time. In ED noted to have a fever to 103.  The patient is a poor historian but, per her son she has had UTIs in the past.  She is noted to be febrile and tachycardic here.  Her white cell count was elevated 11.8.  COVID test was negative.  Her lactic acid was 3.5.  She was given 1 L of crystalloids.  Placed on broad-spectrum antibiotic coverage.  I discussed her care with , and I am asked to admit her.     1. Acute colonic diverticulitis/Sepsis/possible microperforation with abscess formation.  -CT abdomen pelvis findings below.  -Chest x-ray shows no focal infiltrate.  -Zosyn IV  -Continue IV fluid support    -CRS consult and involvement appreciated.   -APAP for pain and fever     2.  Sepsis 2/2 to above. GNR BACTERIEMIA  Leukocytosis, elevated LA, tachycardia, DANIEL.   White count normalized.  Renal function back to baseline.  Follow-up culture result, suspect E. coli most likely.    3  Hyponatremia /hypokalemia/hypochloremia  -Sodium improved, potassium and chloride corrected.  Monitor and replace per protocol.     4. DANIEL on CKD stage II.  Resolved.  - Suspect 2/2 sepsis and fluid depletion.  - Hydration, ABX, no NSAID or nephrotoxic.     5. Essential hypertension.  - hydrochlorothiazide on hold     6. Worsening of chronic anemia in the context os sepsis.   Close follow up     Diet:   NPO.     Ward Catheter: Not present  Lines: None     Cardiac Monitoring: None  Code Status:   DNR/DNI.     Clinically Significant Risk Factors Present on Admission         # Hypokalemia: Lowest K = 3.2 mmol/L in last 2 days, will replace as needed         # Drug  Induced Platelet Defect: home medication list includes an antiplatelet medication   # Hypertension: Noted on problem list         DVT Prophylaxis: Pneumatic Compression Devices  Code Status: No CPR- Do NOT Intubate    Disposition: Expected discharge once resoled and po tolerance.    Interval History   Bhakti is pleasantly demented but she feels better, no fever/pain or distension. CRS is recommending to ADAT to LFD. Because the patient will not have surgical intervention they signed off.     -Data reviewed today: I reviewed all new labs and imaging results over the last 24 hours.     Physical Exam   Temp: 98.7  F (37.1  C) Temp src: (Pended) Oral BP: (Pended) 127/55 Pulse: (Pended) 75   Resp: (Pended) 20 SpO2: (Pended) 98 % O2 Device: (Pended) None (Room air)    Vitals:    07/26/23 2100 07/27/23 0210   Weight: 45.4 kg (100 lb) 49.6 kg (109 lb 5.6 oz)     Vital Signs with Ranges  Temp:  [97.9  F (36.6  C)-98.8  F (37.1  C)] 98.7  F (37.1  C)  Pulse:  [75-99] (P) 75  Resp:  [20] (P) 20  BP: (115-139)/(55-81) (P) 127/55  SpO2:  [95 %-98 %] (P) 98 %  I/O last 3 completed shifts:  In: 1050 [P.O.:200; I.V.:850]  Out: -     GEN:  Alert, oriented x 3, appears comfortable, NAD.  HEENT:  Normocephalic/atraumatic, no scleral icterus, no nasal discharge, mouth moist.  CV:  Regular rate and rhythm, no murmur or JVD.  S1 + S2 noted, no S3 or S4.  LUNGS:  Clear to auscultation bilaterally without rales/rhonchi/wheezing/retractions.  Symmetric chest rise on inhalation noted.  ABD:  Active bowel sounds, soft, non-tender/non-distended.  No rebound/guarding/rigidity.  EXT:  No edema or cyanosis.  No joint synovitis noted.  SKIN:  Dry to touch, no exanthems noted in the visualized areas.       Medications    dextrose 5% and 0.45% NaCl + KCl 20 mEq/L 75 mL/hr at 07/28/23 2029    sodium chloride Stopped (07/27/23 1739)      piperacillin-tazobactam  2.25 g Intravenous Q6H    sodium chloride (PF)  3 mL Intracatheter Q8H       Data   Recent  Labs   Lab 07/28/23  0652 07/28/23  0448 07/28/23  0001 07/27/23  1957 07/27/23  1755 07/27/23  1742 07/27/23  0555 07/26/23  2115   WBC 7.7  --   --   --   --   --  23.6* 11.8*   HGB 8.1*  --   --   --   --   --  8.7* 10.7*   MCV 98  --   --   --   --   --  96 96   *  --   --   --   --   --  153 179   *  --   --   --   --   --  130* 131*   POTASSIUM 3.6  --   --   --  3.5  --  3.2* 3.2*   CHLORIDE 102  --   --   --   --   --  95* 91*   CO2 22  --   --   --   --   --  24 25   BUN 10.9  --   --   --   --   --  11.8 12.0   CR 0.93  --   --   --   --   --  0.99* 0.84   ANIONGAP 9  --   --   --   --   --  11 15   RAGINI 7.9*  --   --   --   --   --  8.2 9.5   * 115* 106*   < >  --    < > 137* 118*   ALBUMIN  --   --   --   --   --   --   --  4.2   PROTTOTAL  --   --   --   --   --   --   --  7.4   BILITOTAL  --   --   --   --   --   --   --  0.7   ALKPHOS  --   --   --   --   --   --   --  64   ALT  --   --   --   --   --   --   --  49   AST  --   --   --   --   --   --   --  35    < > = values in this interval not displayed.       No results found for this or any previous visit (from the past 24 hour(s)).        Securely message with the [employer]oc[employer] Web Console (learn more here)  Text page via [employer] Paging/Directory        Disclaimer: This note consists of symbols derived from keyboarding, dictation and/or voice recognition software. As a result, there may be errors in the script that have gone undetected. Please consider this when interpreting information found in this chart.

## 2023-07-29 NOTE — PLAN OF CARE
Goal Outcome Evaluation:          Pertinent assessments: Pt Alert to self only. VSS, on RA. Up Ax1 with addi steady. tolerating sips of clear liquids denies nausea. Loose stool x2. IVF. On tele SR. Slept well.   Major Shift Event   none    Treatment Plan  increase diet, ABX.   Bedside Nurse: Jaycee Carey RN

## 2023-07-30 NOTE — PLAN OF CARE
Goal Outcome Evaluation:       End of Shift Summary  For vital signs and complete assessments, please see documentation flowsheets.     Pertinent assessments: Alert to self only. Confused and  restless this shift. Bed alarm on. up in room with walker -belt and assist of one---   Major Shift Events : None  Treatment Plan: Zosyn  Bedside Nurse: Erin Pollard RN

## 2023-07-30 NOTE — PROGRESS NOTES
"   07/30/23 1215   Appointment Info   Signing Clinician's Name / Credentials (PT) Emelina Bartlett, DPMIRYAM   Quick Adds   Quick Adds Vestibular Eval   Living Environment   People in Home facility resident;spouse   Current Living Arrangements assisted living;apartment   Home Accessibility no concerns   Living Environment Comments Patient poor historian.  Per chart, is from [facility].   Self-Care   Usual Activity Tolerance moderate   Current Activity Tolerance poor   Equipment Currently Used at Home walker, rolling;grab bar, toilet;grab bar, tub/shower;shower chair   Fall history within last six months yes   Number of times patient has fallen within last six months 1   Activity/Exercise/Self-Care Comment Per social work note on 7/28, \" Patient receives services as follows: bathing, dressing, grooming, med management, and meals.  Pt is able to walk independently with her walker, eat independently, and take herself to the bathroom.  Staff assist the pt with toileting if she needs her brief changed or other assistance.\" Per daughter over phone, patient has been requiring Ax1 with ambulation the past week following fall due to reports of dizziness while walking.  Patient previously independent with ambulation, however has required a walker since fall due to dizziness.   General Information   Onset of Illness/Injury or Date of Surgery 07/26/23   Referring Physician Bhupinder Dutton MD   Patient/Family Therapy Goals Statement (PT) Patient would like to \"go home to my hiusband.\"  Daughter indicated that she would like patient to return to [facility] with increased services, possibly with hospice?   Pertinent History of Current Problem (include personal factors and/or comorbidities that impact the POC) Per medical chart: Bhakti Marrero is a 89 year old female admitted on 7/26/2023. She has a hx of dementia, breast ca, HTN, HLP who presents with N/V. She has been throwing up since lunch time. In ED noted to have a fever to 103.  The " patient is a poor historian but, per her son she has had UTIs in the past.  She is noted to be febrile and tachycardic here.  Her white cell count was elevated 11.8.  COVID test was negative.  Her lactic acid was 3.5.  She was given 1 L of crystalloids.  Placed on broad-spectrum antibiotic coverage. Patient was admitted for further management   Existing Precautions/Restrictions fall   Cognition   Orientation Status (Cognition) oriented to;person   Pain Assessment   Patient Currently in Pain No   Posture    Posture Forward head position   Range of Motion (ROM)   Range of Motion ROM is WFL   Strength (Manual Muscle Testing)   Strength (Manual Muscle Testing) Deficits observed during functional mobility   Transfers   Transfers sit-stand transfer   Sit-Stand Transfer   Sit-Stand [place] (Transfers) contact guard   Assistive Device (Sit-Stand Transfers) walker, front-wheeled   Gait/Stairs (Locomotion)   [place] Level (Gait) contact guard   Assistive Device (Gait) walker, front-wheeled   Balance   Balance Comments Patient with history of fall, conitnues with increased fall risk secondary to dizziness.  Requires UE support during mobility   Cervicogenic Screen   Neck ROM Mild limitations in cervical extension consistent with age related changes   Oculomotor Exam   Smooth Pursuit Normal   Saccades Normal   VOR Normal   Convergence Testing Normal   Infrared Goggle Exam or Frenzel Lense Exam   Spontaneous Nystagmus Negative   Yolanda-Hallpike (Right) Negative   Yolanda-Hallpike (Left) Negative   Clinical Impression   Criteria for Skilled Therapeutic Intervention Yes, treatment indicated   PT Diagnosis (PT) Impaired functional mobility   Influenced by the following impairments Dizziness, fatigue, decreased strength, decreased activity tolerance, decreased balance   Functional limitations due to impairments difficulties with transfers and ambulation   Clinical Presentation (PT Evaluation Complexity) Evolving/Changing  "  Clinical Presentation Rationale clinical judgement   Clinical Decision Making (Complexity) moderate complexity   Planned Therapy Interventions (PT) balance training;gait training;patient/family education;strengthening;transfer training;progressive activity/exercise;home program guidelines   Anticipated Equipment Needs at Discharge (PT)   (has walker at [facility])   Risk & Benefits of therapy have been explained evaluation/treatment results reviewed;care plan/treatment goals reviewed;risks/benefits reviewed;current/potential barriers reviewed;participants voiced agreement with care plan;participants included;patient;daughter   PT Total Evaluation Time   PT Eval, Low Complexity Minutes (66343) 10   Physical Therapy Goals   PT Frequency Daily   PT Predicted Duration/Target Date for Goal Attainment 08/04/23   PT Goals Transfers;Gait   PT: Transfers Supervision/stand-by assist;Sit to/from stand;Bed to/from chair;Assistive device   PT: Gait Supervision/stand-by assist;Assistive device;150 feet   Interventions   Interventions Quick Adds Neuromuscular Re-ed;Therapeutic Activity   Therapeutic Activity   Therapeutic Activities: dynamic activities to improve functional performance Minutes (45998) 15   Symptoms Noted During/After Treatment Dizziness;Fatigue   Treatment Detail/Skilled Intervention Patient seated in bedside chair upon arrival.  Requested to use bathroom.  Walker placed in front of patient.  Patient stated \"I don't usually use this!\"  Facilitated transfer between sitting and standing with 2WW and CGA.  Patient began ambulating, then began moaning.  When asked what was wrong, patient stated she was very dizzy.  Therapist asked patient to return to sitting on bed, however patient continued walking 10 feet to bathroom with CGA, assist for line/tube management.  Facilitated transfer to toilet with direct cueing for doffing briefs, safe use of grab bars.  Once in sitting, patient reported mild improvement in dizziness, " able to perform coco cares independently.  Returned to standing with 2WW and CGA.  Faciltiated amb 10 feet to bedside with 2WW and CGA; cued for safe transfer to sitting at EOB.  Reported continued dizziness, BP assessed in sittin/88.   In bed with all needs within reach, bed alarm on, RN alerted to patient's reports of dizziness.   Neuromuscular Re-education   Neuromuscular Re-Education Minutes (73438) 10   Symptoms Noted During/After Treatment dizziness   Treatment Detail/Skilled Intervention In sitting, performed ocular motor exam with no deficits, no nystagmus noted.  Performed modified (limited cervical extension) Paradise-Hallpike with no symptoms/nystagmus.  BP assessed in supine, 163/77.  Patient reports complete resolution of dizziness/nausea in supine and sidelying.   PT Discharge Planning   PT Plan check true orthostatics, progress ambulation as tolerated   PT Discharge Recommendation (DC Rec) home with assist;home with home care physical therapy   PT Rationale for DC Rec Patient presents significantly below baseline for functional mobility.  Patient is limited by reports of dizziness in sitting and standing.  Dizziness does not appear to be due to peripheral vertigo including differential diagnoses of BPPV or neuritis given negative Paradise Hallpike and no apparent nystagmus with all mobility.  Dizziness is apparently positional, but only in terms of supine vs standing, consistent with systemic medical co-morbidity.  Recommend return to senior living following medical management with increased services to include Ax1 with all mobility (transfers and gait with 2WW) secondary to increased fall risk due to dizziness, home PT to increase independence with mobility   PT Brief overview of current status Ax1 with 2WW, high fall risk secondary to dizziness in standing   Total Session Time   Timed Code Treatment Minutes 25   Total Session Time (sum of timed and untimed services) 35

## 2023-07-30 NOTE — PROGRESS NOTES
[facility]    Hospitalist Progress Note    Date of Service (when I saw the patient): 07/30/2023  Provider:  Manjinder Hooker MD       Assessment & Plan   Bhakti Marrero is a 89 year old female admitted on 7/26/2023. She has a hx of dementia, breast ca, HTN, HLP who presents with N/V. She has been throwing up since lunch time. In ED noted to have a fever to 103.  The patient is a poor historian but, per her son she has had UTIs in the past.  She is noted to be febrile and tachycardic here.  Her white cell count was elevated 11.8.  COVID test was negative.  Her lactic acid was 3.5.  She was given 1 L of crystalloids.  Placed on broad-spectrum antibiotic coverage. Patient was admitted for further management     Acute colonic diverticulitis/Sepsis/possible microperforation with abscess formation.  -CT abdomen pelvis showed findings consistent with proximal sigmoid diverticulitis complicated by contained perforation   -Chest x-ray shows no focal infiltrate.  -Will continue Zosyn IV  -Continue IV fluid support    -CRS consult and involvement appreciated.   -APAP for pain and fever     Sepsis 2/2 to above. GNR BACTERIEMIA  Leukocytosis, elevated LA, tachycardia, DANIEL.   White count normalized.  Renal function back to baseline.  1 set of blood culture from 7/26 grew anaerobic gram-negative bacilli.  The other set of blood culture from 7/26 grew gram-positive cocci in pairs and chains.  -We will continue IV Zosyn  -We will follow final culture result    Hyponatremia /hypokalemia/hypochloremia  -Sodium improved, potassium and chloride corrected.  Monitor and replace per protocol.     DANIEL on CKD stage II.  Resolved.  - Suspect 2/2 sepsis and fluid depletion.  - Hydration, ABX, no NSAID or nephrotoxic.   -We will monitor renal function    Essential hypertension.  - hydrochlorothiazide on hold due to DANIEL    Worsening of chronic anemia in the context os sepsis.   Close follow up     Diet:   NPO.     Ward  Catheter: Not present  Lines: None     Cardiac Monitoring: None  Code Status:   DNR/DNI.    # Hypokalemia: Lowest K = 3.2 mmol/L in last 2 days, will replace as needed         # Drug Induced Platelet Defect: home medication list includes an antiplatelet medication   # Hypertension: Noted on problem list       DVT Prophylaxis: Pneumatic Compression Devices  Code Status: No CPR- Do NOT Intubate    Disposition: Expected discharge once     Interval History   Patient seen and examined.She states that she is feeling better.  Abdominal pain improving.  Denies nausea or vomiting.  No fever.    -Data reviewed today: I reviewed all new labs and imaging results over the last 24 hours.     Physical Exam   Temp: 98.6  F (37  C) Temp src: Oral BP: 124/62 Pulse: 80   Resp: 20 SpO2: 99 % O2 Device: None (Room air)    Vitals:    07/26/23 2100 07/27/23 0210   Weight: 45.4 kg (100 lb) 49.6 kg (109 lb 5.6 oz)     Vital Signs with Ranges  Temp:  [97.6  F (36.4  C)-98.6  F (37  C)] 98.6  F (37  C)  Pulse:  [] 80  Resp:  [16-20] 20  BP: (124-152)/(57-75) 124/62  SpO2:  [96 %-99 %] 99 %  I/O last 3 completed shifts:  In: 1475 [P.O.:625; I.V.:850]  Out: -     GEN:  Alert, oriented x 3, appears comfortable, NAD.  HEENT:  Normocephalic/atraumatic, no scleral icterus, no nasal discharge, mouth moist.  CV:  Regular rate and rhythm, no murmur or JVD.  S1 + S2 noted, no S3 or S4.  LUNGS:  Clear to auscultation bilaterally without rales/rhonchi/wheezing/retractions.  Symmetric chest rise on inhalation noted.  ABD:  Active bowel sounds, soft, non-tender/non-distended.  No rebound/guarding/rigidity.  EXT:  No edema or cyanosis.  No joint synovitis noted.  SKIN:  Dry to touch, no exanthems noted in the visualized areas.       Medications    dextrose 5% and 0.45% NaCl + KCl 20 mEq/L 75 mL/hr at 07/29/23 1212    sodium chloride Stopped (07/27/23 1739)      piperacillin-tazobactam  2.25 g Intravenous Q6H    sodium chloride (PF)  3 mL Intracatheter  Q8H       Data   Recent Labs   Lab 07/30/23  1242 07/30/23  0443 07/28/23  0652 07/28/23  0448 07/27/23  1742 07/27/23  0555 07/26/23  2115   WBC  --  5.2 7.7  --   --  23.6* 11.8*   HGB  --  8.8* 8.1*  --   --  8.7* 10.7*   MCV  --  96 98  --   --  96 96   PLT  --  131* 117*  --   --  153 179   NA  --  136 133*  --   --  130* 131*   POTASSIUM 3.5 3.3* 3.6  --    < > 3.2* 3.2*   CHLORIDE  --  104 102  --   --  95* 91*   CO2  --  20* 22  --   --  24 25   BUN  --  3.1* 10.9  --   --  11.8 12.0   CR  --  0.78 0.93  --   --  0.99* 0.84   ANIONGAP  --  12 9  --   --  11 15   RAGINI  --  8.2 7.9*  --   --  8.2 9.5   GLC  --  151* 106* 115*   < > 137* 118*   ALBUMIN  --   --   --   --   --   --  4.2   PROTTOTAL  --   --   --   --   --   --  7.4   BILITOTAL  --   --   --   --   --   --  0.7   ALKPHOS  --   --   --   --   --   --  64   ALT  --   --   --   --   --   --  49   AST  --   --   --   --   --   --  35    < > = values in this interval not displayed.         No results found for this or any previous visit (from the past 24 hour(s)).        Securely message with the Vocera Web Console (learn more here)  Text page via [employer] Paging/Directory        Disclaimer: This note consists of symbols derived from keyboarding, dictation and/or voice recognition software. As a result, there may be errors in the script that have gone undetected. Please consider this when interpreting information found in this chart.     Patient expressed no known problems or needs

## 2023-07-30 NOTE — PLAN OF CARE
Goal Outcome Evaluation:                      Pertinent assessments  Alert to self -- occasionally  restless --- up to bathroom voiding and stooling frequently -- Dr Ricketts  notified x2s with no answer if he wanted a  Cdiff test done--- tolerating small amounts of low fiber diet --- up with walker - belt and 1 assist  in room----  denies pain and nausea ----K 3.3 replaced   3.2 replaced with ordered recheck   Major Shift Events  up in room  Treatment Plan  monitor  Bedside Nurse: Susan Bergman RN

## 2023-07-30 NOTE — PROGRESS NOTES
Cross cover note.  Notified that previous blood culture is now growing gram-positive cocci in pairs and chains in addition to previous gram-negative rods.  Most suspicious for streptococcal bacteria.  Should be covered by Zosyn.  Now afebrile.  White count improved.  Would not escalate antibiotics at this time.    Continue IV Zosyn.  Repeat 2 blood cultures.  Await sensitivities.

## 2023-07-31 NOTE — PROGRESS NOTES
[facility]    Hospitalist Progress Note    Date of Service (when I saw the patient): 07/31/2023  Provider:  Manjinder Hooker MD       Assessment & Plan   Bhakti Mrarero is a 89 year old female admitted on 7/26/2023. She has a hx of dementia, breast ca, HTN, HLP who presents with N/V. She has been throwing up since lunch time. In ED noted to have a fever to 103.  The patient is a poor historian but, per her son she has had UTIs in the past.  She is noted to be febrile and tachycardic here.  Her white cell count was elevated 11.8.  COVID test was negative.  Her lactic acid was 3.5.  She was given 1 L of crystalloids.  Placed on broad-spectrum antibiotic coverage. Patient was admitted for further management     Acute colonic diverticulitis/Sepsis/possible microperforation with abscess formation.  -CT abdomen pelvis showed findings consistent with proximal sigmoid diverticulitis complicated by contained perforation   -Chest x-ray shows no focal infiltrate.  -Will continue Zosyn IV  -Continue IV fluid support    -CRS consult and involvement appreciated.  Colorectal surgery signed off  -APAP for pain and fever     Sepsis 2/2 to above. GNR BACTERIEMIA  Leukocytosis, elevated LA, tachycardia, DANIEL.   White count normalized.  Renal function back to baseline.  1 set of blood culture from 7/26 grew bacteroids dorei/vulgatus. The other set of blood culture from 7/26 grew gram-positive cocci in pairs and chains.  -We will continue IV Zosyn  -We will switch to fluoroquinolone and metronidazole at discharge to complete 14 days course of treatment    Hyponatremia /hypokalemia/hypochloremia  -Sodium improved, potassium and chloride corrected.  Monitor and replace per protocol.     DANIEL on CKD stage II.  Resolved.  - Suspect 2/2 sepsis and fluid depletion.  - Hydration, ABX, no NSAID or nephrotoxic.   -We will monitor renal function    Essential hypertension.  - hydrochlorothiazide on hold due to  DANIEL    Worsening of chronic anemia in the context os sepsis.   Close follow up     Diet:  low fiber diet     Ward Catheter: Not present  Lines: None     Cardiac Monitoring: None  Code Status:   DNR/DNI.    # Hypokalemia: Lowest K = 3.2 mmol/L in last 2 days, will replace as needed         # Drug Induced Platelet Defect: home medication list includes an antiplatelet medication   # Hypertension: Noted on problem list       DVT Prophylaxis: Pneumatic Compression Devices  Code Status: No CPR- Do NOT Intubate    Disposition: Expected discharge to [facility] likely tomorrow    Interval History   Patient seen and examined.She states that she is feeling better.  Abdominal pain improving.  Denies nausea or vomiting.  No fever.    -Data reviewed today: I reviewed all new labs and imaging results over the last 24 hours.     Physical Exam   Temp: 97.8  F (36.6  C) Temp src: Oral BP: (!) 155/75 Pulse: 80   Resp: 18 SpO2: 99 % O2 Device: None (Room air)    Vitals:    07/26/23 2100 07/27/23 0210   Weight: 45.4 kg (100 lb) 49.6 kg (109 lb 5.6 oz)     Vital Signs with Ranges  Temp:  [97.7  F (36.5  C)-98.6  F (37  C)] 97.8  F (36.6  C)  Pulse:  [76-85] 80  Resp:  [18-28] 18  BP: (124-167)/(62-81) 155/75  SpO2:  [98 %-99 %] 99 %  I/O last 3 completed shifts:  In: 1640 [P.O.:640; I.V.:1000]  Out: -     GEN:  Alert, oriented x 3, appears comfortable, NAD.  HEENT:  Normocephalic/atraumatic, no scleral icterus, no nasal discharge, mouth moist.  CV:  Regular rate and rhythm, no murmur or JVD.  S1 + S2 noted, no S3 or S4.  LUNGS:  Clear to auscultation bilaterally without rales/rhonchi/wheezing/retractions.  Symmetric chest rise on inhalation noted.  ABD:  Active bowel sounds, soft, non-tender/non-distended.  No rebound/guarding/rigidity.  EXT:  No edema or cyanosis.  No joint synovitis noted.  SKIN:  Dry to touch, no exanthems noted in the visualized areas.       Medications        piperacillin-tazobactam  2.25 g Intravenous Q6H    sodium  chloride (PF)  3 mL Intracatheter Q8H       Data   Recent Labs   Lab 07/31/23  0603 07/30/23  1927 07/30/23  1425 07/30/23  1242 07/30/23  0443 07/28/23  0652 07/27/23  0555 07/26/23  2115   WBC 5.6  --   --   --  5.2 7.7   < > 11.8*   HGB 8.2*  --   --   --  8.8* 8.1*   < > 10.7*   MCV 98  --   --   --  96 98   < > 96   *  --   --   --  131* 117*   < > 179     --   --   --  136 133*   < > 131*   POTASSIUM 3.7 3.8 3.2*   < > 3.3* 3.6   < > 3.2*   CHLORIDE 104  --   --   --  104 102   < > 91*   CO2 22  --   --   --  20* 22   < > 25   BUN <1.4*  --   --   --  3.1* 10.9   < > 12.0   CR 0.77  --   --   --  0.78 0.93   < > 0.84   ANIONGAP 10  --   --   --  12 9   < > 15   RAGINI 8.1*  --   --   --  8.2 7.9*   < > 9.5   GLC 98  --   --   --  151* 106*   < > 118*   ALBUMIN  --   --   --   --   --   --   --  4.2   PROTTOTAL  --   --   --   --   --   --   --  7.4   BILITOTAL  --   --   --   --   --   --   --  0.7   ALKPHOS  --   --   --   --   --   --   --  64   ALT  --   --   --   --   --   --   --  49   AST  --   --   --   --   --   --   --  35    < > = values in this interval not displayed.         No results found for this or any previous visit (from the past 24 hour(s)).        Securely message with the Vocera Web Console (learn more here)  Text page via [employer] Paging/Directory        Disclaimer: This note consists of symbols derived from keyboarding, dictation and/or voice recognition software. As a result, there may be errors in the script that have gone undetected. Please consider this when interpreting information found in this chart.

## 2023-07-31 NOTE — PLAN OF CARE
Goal Outcome Evaluation:    Pertinent assessments: Alert to self, place. Anxious about going home. SBA with walker, VSS, Ortho bp WNL. Min appetite (notes this is her baseline). Denies pain/sob/nausea.     Major Shift Events Loss IV access, transitioned to oral. First dose of Cipro given. IV fluids discontinued.    Treatment Plan: Discharge to AL 8/1 in AM.

## 2023-07-31 NOTE — PROGRESS NOTES
Care Management Follow Up    Length of Stay (days): 5    Expected Discharge Date: 08/01/2023     Concerns to be Addressed: discharge planning     Patient plan of care discussed at interdisciplinary rounds: Yes    Anticipated Discharge Disposition: Assisted Living     Anticipated Discharge Services: None  Anticipated Discharge DME: Walker    Patient/family educated on Medicare website which has current facility and service quality ratings:  (N/A)  Education Provided on the Discharge Plan: Yes  Patient/Family in Agreement with the Plan: yes    Referrals Placed by CM/SW:  n/a  Private pay costs discussed: Not applicable    Additional Information:  NINA spoke with Fermin at [facility] P: [phone].  Fax: [phone]   Send [facility] total care pharmacy.     She prefers patient return by 1200.   [facility] needs report called to [facility]. NINA updated RN.     NINA spoke with daughter Cuca. She will call her brother to see about transporting and call NINA back. NINA discussed transport costs as well.     Addendum 1325: Transport arranged for tomorrow between 3205-0596. NINA appreciates orders completed by 0900. Updated MD.     TAVO Carrillo, SW  Emergency Room   Please contact the SW on the floor in which the patient is staying for any questions or concerns

## 2023-07-31 NOTE — PLAN OF CARE
Goal Outcome Evaluation:       Pertinent assessments  Pt Alert to self only. VSS, on RA. up Ax1 with gait belt and walker to bathroom, voiding and stooling frequently. tolerating small amounts of low fiber diet. Denies pain and nausea. Slept well.  Major Shift Events  none  Treatment Plan  monitor, abx, possible discharge today.  Bedside Nurse: Jaycee Carey RN

## 2023-08-01 NOTE — PLAN OF CARE
To Do:  End of Shift Summary  For vital signs and complete assessments, please see documentation flowsheets.     Pertinent assessments: Alert to self. VSS on RA. Pt. anxious about unknown surrounding. Bed side attended @ bedside. A1 with G/W. Denies pain/sob No IV access. Pt. had 1 x small mucus emesis. Zofran x1 w/ effect.     Major Shift Event: increased anxiety from unknown environment. Unable to redirect. Bed side attendant in room.    Treatment Plan: Discharge to AL 8/1 in AM.     Bedside Nurse: Kerrie Baxter RN

## 2023-08-01 NOTE — PLAN OF CARE
Goal Outcome Evaluation:    Pertinent assessments: Assumed cares [id number]. Pt sleeping, responded to voice. Oriented to self only. Denies pain or shortness of breath. LS dim on RA. Afebrile. BP 150s/70s. Denies N/V. Assist of 1 with gaitbelt and walker. Sitter discontinued; bed alarm and floor alarm in use.

## 2023-08-01 NOTE — PLAN OF CARE
Discharge Note    Patient discharged to [facility] via EMS/BLS accompanied by other  .  IV: Discontinued  Prescriptions e-prescribed to pharmacy.   Belongings reviewed and sent with patient and N/A  Home medications returned to patient: NA  Equipment sent with: N/A.   Instructions given over the phone to Cuca (pt's daughter) & verbalizes understanding of discharge instructions. AVS given to   [facility] .  Additional education completed?  N/A

## 2023-08-01 NOTE — PLAN OF CARE
Physical Therapy Discharge Summary    Reason for therapy discharge:    Discharged to home with home therapy.    Progress towards therapy goal(s). See goals on Care Plan in [facility] electronic health record for goal details.  Goals partially met.  Barriers to achieving goals:   discharge from facility.    Therapy recommendation(s):    Continued therapy is recommended.  Rationale/Recommendations:  HHPT to maximize safety and indep in the home setting.    Note: Pt not seen by documenting PT on this date. Information obtained from chart review.

## 2023-08-01 NOTE — PROGRESS NOTES
Care Management Discharge Note    Discharge Date: 08/01/2023       Discharge Disposition: Assisted Living    Discharge Services: None    Discharge DME: Walker    Discharge Transportation:      Private pay costs discussed: transportation costs    Does the patient's insurance plan have a 3 day qualifying hospital stay waiver?  No    PAS Confirmation Code: N/A  Patient/family educated on Medicare website which has current facility and service quality ratings:  (N/A)    Education Provided on the Discharge Plan: Yes  Persons Notified of Discharge Plans: family, [facility]   Patient/Family in Agreement with the Plan: yes    Handoff Referral Completed: No    Additional Information:  Faxed orders to [facility] for daughter and son.     Addendum [id number]: SW spoke with daughter who agrees to wheelchair transport.     TAVO Carrillo, LGSW  Emergency Room   Please contact the SW on the floor in which the patient is staying for any questions or concerns

## 2023-08-01 NOTE — PROGRESS NOTES
Care Management Follow Up    Length of Stay (days): 6    Expected Discharge Date: 08/01/2023     Concerns to be Addressed: discharge planning         Additional Information:  CM received call from nurse Blanca at [facility] with question regarding antibiotics. She wanted them filled here in bubble packs stating that they will not get a delivery from [facility] in time for her to receive her antibiotics today. Unfortunately the RX had already been sent to [facility] so it would require MD redoing the order then our pharmacy filling. Transportation arrived and there was not time. Called [facility] and they are putting a rush on the two rx and will have them delivered this afternoon.   Updated nurse at [facility].     Lashawn Brewster RN  Care Coordinator  [facility]

## 2023-08-02 NOTE — PROGRESS NOTES
[facility] [place]    Background: Transitional Care Management program identified per system criteria and reviewed by [facility] [place] team for possible outreach.    Assessment: Upon chart review, [facility] Team member will not proceed with patient outreach related to this episode of Transitional Care Management program due to reason below:    Patient has discharged to a Memory Care, Long-term Care, Assisted Living or Group Home where patient is receiving on-site support with their daily cares, including support with hospital follow up plan.    Plan: Transitional Care Management episode addressed appropriately per reason noted above.      My Mason RN  [facility], [facility]    *Connected Care Resource Team does NOT follow patient ongoing. Referrals are identified based on internal discharge reports and the outreach is to ensure patient has an understanding of their discharge instructions.

## 2023-08-07 NOTE — DISCHARGE SUMMARY
[facility]    Discharge Summary  Hospitalist    Date of Admission:  7/26/2023  Date of Discharge:  8/1/2023 10:36 AM  Discharging Provider: Manjinder Hooker MD, MD  Date of Service (when I saw the patient): 8/1/2023    Discharge Diagnoses      Acute colonic diverticulitis/Sepsis/possible microperforation with abscess formation.     Sepsis 2/2 to above. GNR BACTERIEMIA    Hyponatremia /hypokalemia/hypochloremia    DANIEL on CKD stage II.  Resolved.     Essential hypertension.    Worsening of chronic anemia in the context os sepsis.     Code Status:   DNR/DNI.     # Hypokalemia: Lowest K = 3.2 mmol/L in last 2 days, will replace as needed         # Drug Induced Platelet Defect: home medication list includes an antiplatelet medication   # Hypertension: Noted on problem list          History of Present Illness   Bhakti Marrero is an 89 year old female who presented with abdominal pain.    Hospital Course   Assessment & Plan  Bhakti Marrero is a 89 year old female admitted on 7/26/2023. She has a hx of dementia, breast ca, HTN, HLP who presents with N/V. She has been throwing up since lunch time. In ED noted to have a fever to 103.  The patient is a poor historian but, per her son she has had UTIs in the past.  She is noted to be febrile and tachycardic here.  Her white cell count was elevated 11.8.  COVID test was negative.  Her lactic acid was 3.5.  She was given 1 L of crystalloids.  Placed on broad-spectrum antibiotic coverage. Patient was admitted for further management     Acute colonic diverticulitis/Sepsis/possible microperforation with abscess formation.  -CT abdomen pelvis showed findings consistent with proximal sigmoid diverticulitis complicated by contained perforation   -Chest x-ray shows no focal infiltrate.  -Will continue Zosyn IV  -Continue IV fluid support    -CRS consult and involvement appreciated.  Colorectal surgery signed off  -APAP for pain and fever  -Pain improved.  Patient was discharged on Cipro and flagyl to complete 2 weeks of treatment  -She was advised to follow up with PCP/colorectal surgery as outpatient.     Sepsis 2/2 to above. GNR BACTERIEMIA  Leukocytosis, elevated LA, tachycardia, DANIEL.   White count normalized.  Renal function back to baseline.  1 set of blood culture from 7/26 grew bacteroids dorei/vulgatus. The other set of blood culture from 7/26 grew gram-positive cocci in pairs and chains.  -We will continue IV Zosyn  -Switched to  fluoroquinolone and metronidazole at discharge to complete 14 days course of treatment     Hyponatremia /hypokalemia/hypochloremia  -Sodium improved, potassium and chloride corrected.  Monitor and replace per protocol.     DANIEL on CKD stage II.  Resolved.  - Suspect 2/2 sepsis and fluid depletion.  - Hydration, ABX, no NSAID or nephrotoxic.   -We will monitor renal function     Essential hypertension.  - hydrochlorothiazide on hold due to DANIEL     Worsening of chronic anemia in the context os sepsis.   Close follow up      Diet:  low fiber diet     Ward Catheter: Not present  Lines: None     Cardiac Monitoring: None  Code Status:   DNR/DNI.     # Hypokalemia: Lowest K = 3.2 mmol/L in last 2 days, will replace as needed         # Drug Induced Platelet Defect: home medication list includes an antiplatelet medication   # Hypertension: Noted on problem list        DVT Prophylaxis: Pneumatic Compression Devices  Code Status: No CPR- Do NOT Intubate     Disposition: Expected discharge to [facility] likely tomorrow                Significant Results and Procedures   Results for orders placed or performed during the hospital encounter of 07/26/23   CT Abdomen Pelvis w Contrast     Value    Radiologist flags Perforated viscus (AA)    Narrative    EXAM: CT ABDOMEN PELVIS W CONTRAST  LOCATION: [facility]  DATE: 7/27/2023    INDICATION: nausea vomiting, fever  COMPARISON: None.  TECHNIQUE: CT scan of the abdomen and pelvis was  performed following injection of IV contrast. Multiplanar reformats were obtained. Dose reduction techniques were used.  CONTRAST: 50 mL Isovue 370    FINDINGS:   LOWER CHEST: Tortuous, calcified descending thoracic aorta.    HEPATOBILIARY: Distended gallbladder. No calcified gallstones. Normal liver.    PANCREAS: Normal.    SPLEEN: Normal.    ADRENAL GLANDS: Normal.    KIDNEYS/BLADDER: Benign right renal cysts requiring no follow-up. No hydronephrosis. Bladder is normal.    BOWEL: Normal appendix. Normal caliber small bowel. Colonic diverticulosis. Wall thickening and pericolonic edema involving the redundant proximal sigmoid colon. There is a 4 x 2 cm extraluminal collection which is predominantly gasless with scant fluid   along the anterior wall of the proximal sigmoid colon on axial image 133. No ivette pneumoperitoneum.    LYMPH NODES: Normal.    VASCULATURE: Aortoiliac atherosclerotic calcification without aneurysm.    PELVIC ORGANS: Calcified uterine fibroids.    MUSCULOSKELETAL: Osteopenia with degenerative disc change of the visualized spine.      Impression    IMPRESSION:   1.  Findings consistent with proximal sigmoid diverticulitis complicated by contained perforation as above.      [Critical Result: Perforated viscus]    Finding was identified on 7/27/2023 12:14 AM CDT.     1.  Dr. Chavarria was contacted by me on 7/27/2023 12:22 AM CDT and verbalized understanding of the critical result.    XR Chest 2 Views    Narrative    EXAM: XR CHEST 2 VIEWS  LOCATION: [facility]  DATE: 7/26/2023    INDICATION: sepsis  COMPARISON: None.      Impression    IMPRESSION: No focal pulmonary consolidation or effusion. Normal heart size without pulmonary vascular congestion. No pneumothorax. Degenerative changes of the thoracic spine.         Pending Results   None    Code Status   DNR / DNI       Primary Care Physician   Bipin Purdy      Discharge Disposition   Discharged to [facility]  Condition at  discharge: Stable    Consultations This Hospital Stay   PHARMACY TO [place] [facility]  COLORECTAL SURGERY IP CONSULT  CARE MANAGEMENT / SOCIAL WORK IP CONSULT  VESTIBULAR PHYSICAL THERAPY IP CONSULT    Time Spent on this Encounter   I, Manjinder Hooker MD, MD, personally saw the patient today and spent greater than 30 minutes discharging this patient.    Discharge Orders      Reason for your hospital stay    Acute sigmoid diverticulitis with microperforation     Activity    Your activity upon discharge: activity as tolerated     Follow-up and recommended labs and tests     Follow up with primary care provider, Bipin Purdy, within 7 days .  Follow up with colorectal surgery as scheduled     Diet    Follow this diet upon discharge: Orders Placed This Encounter      Low Fiber Diet     Discharge Medications   Discharge Medication List as of 8/1/2023 10:30 AM        CONTINUE these medications which have CHANGED    Details   ciprofloxacin (CIPRO) 500 MG tablet Take 1 tablet (500 mg) by mouth every 12 hours, Disp-16 tablet, R-0, E-Prescribe      metroNIDAZOLE (FLAGYL) 500 MG tablet Take 1 tablet (500 mg) by mouth 3 times daily, Disp-24 tablet, R-0, E-Prescribe           CONTINUE these medications which have NOT CHANGED    Details   cholecalciferol, vitamin D3, 50 mcg (2,000 unit) Tab [CHOLECALCIFEROL, VITAMIN D3, 50 MCG (2,000 UNIT) TAB] 1 p.o. daily, R-0, OTC      hydrochlorothiazide (MICROZIDE) 12.5 MG capsule Take 12.5 mg by mouth daily, Historical      menthol-zinc oxide (CALMOSEPTINE) 0.44-20.6 % OINT ointment Apply topically 2 times daily Until reddened areas resolvedHistorical      traZODone (DESYREL) 50 MG tablet Take 25 mg by mouth At Bedtime, Historical           Allergies   No Known Allergies  Data   Most Recent 3 CBC's:  Recent Labs   Lab Test 08/01/23  0602 07/31/23  0603 07/30/23  0443   WBC 7.6 5.6 5.2   HGB 9.0* 8.2* 8.8*   MCV 94 98 96    129* 131*      Most Recent 3 BMP's:  Recent Labs    Lab Test 08/01/23  0913 08/01/23  0602 07/31/23  1743 07/31/23  0603 07/30/23  1927 07/30/23  1242 07/30/23  0443   NA  --  135*  --  136  --   --  136   POTASSIUM  --  3.6  --  3.7 3.8   < > 3.3*   CHLORIDE  --  102  --  104  --   --  104   CO2  --  22  --  22  --   --  20*   BUN  --  1.7*  --  <1.4*  --   --  3.1*   CR  --  0.78  --  0.77  --   --  0.78   ANIONGAP  --  11  --  10  --   --  12   RAGINI  --  8.5  --  8.1*  --   --  8.2   GLC 98 81 87 98  --   --  151*    < > = values in this interval not displayed.     Most Recent 2 LFT's:  Recent Labs   Lab Test 07/26/23 2115 11/02/21  1430 10/03/19  0955   AST 35  --  19   ALT 49 <9 14   ALKPHOS 64  --  66   BILITOTAL 0.7  --  0.7     Most Recent INR's and Anticoagulation Dosing History:  Anticoagulation Dose History           No data to display              Most Recent 3 Troponin's:No lab results found.  Most Recent Cholesterol Panel:  Recent Labs   Lab Test 11/02/21  1430   CHOL 137   LDL 61   HDL 48*   TRIG 138     Most Recent 6 Bacteria Isolates From Any Culture (See EPIC Reports for Culture Details):No lab results found.  Most Recent TSH, T4 and A1c Labs:  Recent Labs   Lab Test 07/26/23 2115   TSH 4.49*   T4 1.08